# Patient Record
Sex: MALE | Race: AMERICAN INDIAN OR ALASKA NATIVE
[De-identification: names, ages, dates, MRNs, and addresses within clinical notes are randomized per-mention and may not be internally consistent; named-entity substitution may affect disease eponyms.]

---

## 2017-03-03 ENCOUNTER — APPOINTMENT (OUTPATIENT)
Dept: INTERNAL MEDICINE | Facility: CLINIC | Age: 52
End: 2017-03-03

## 2017-03-08 ENCOUNTER — RX RENEWAL (OUTPATIENT)
Age: 52
End: 2017-03-08

## 2017-04-14 ENCOUNTER — APPOINTMENT (OUTPATIENT)
Dept: GASTROENTEROLOGY | Facility: CLINIC | Age: 52
End: 2017-04-14

## 2017-04-26 ENCOUNTER — APPOINTMENT (OUTPATIENT)
Dept: INTERNAL MEDICINE | Facility: CLINIC | Age: 52
End: 2017-04-26

## 2017-05-02 ENCOUNTER — APPOINTMENT (OUTPATIENT)
Dept: INTERNAL MEDICINE | Facility: CLINIC | Age: 52
End: 2017-05-02

## 2017-05-02 ENCOUNTER — OUTPATIENT (OUTPATIENT)
Dept: OUTPATIENT SERVICES | Facility: HOSPITAL | Age: 52
LOS: 1 days | Discharge: HOME | End: 2017-05-02

## 2017-05-02 VITALS
WEIGHT: 226 LBS | HEIGHT: 67 IN | HEART RATE: 64 BPM | BODY MASS INDEX: 35.47 KG/M2 | SYSTOLIC BLOOD PRESSURE: 151 MMHG | DIASTOLIC BLOOD PRESSURE: 83 MMHG

## 2017-06-15 ENCOUNTER — MESSAGE (OUTPATIENT)
Age: 52
End: 2017-06-15

## 2017-06-21 ENCOUNTER — OUTPATIENT (OUTPATIENT)
Dept: OUTPATIENT SERVICES | Facility: HOSPITAL | Age: 52
LOS: 1 days | Discharge: HOME | End: 2017-06-21

## 2017-06-28 DIAGNOSIS — D12.2 BENIGN NEOPLASM OF ASCENDING COLON: ICD-10-CM

## 2017-06-28 DIAGNOSIS — D12.4 BENIGN NEOPLASM OF DESCENDING COLON: ICD-10-CM

## 2017-06-28 DIAGNOSIS — I10 ESSENTIAL (PRIMARY) HYPERTENSION: ICD-10-CM

## 2017-06-28 DIAGNOSIS — E11.9 TYPE 2 DIABETES MELLITUS WITHOUT COMPLICATIONS: ICD-10-CM

## 2017-06-28 DIAGNOSIS — D12.5 BENIGN NEOPLASM OF SIGMOID COLON: ICD-10-CM

## 2017-06-28 DIAGNOSIS — E78.00 PURE HYPERCHOLESTEROLEMIA, UNSPECIFIED: ICD-10-CM

## 2017-06-28 DIAGNOSIS — Z12.11 ENCOUNTER FOR SCREENING FOR MALIGNANT NEOPLASM OF COLON: ICD-10-CM

## 2017-06-28 DIAGNOSIS — D12.3 BENIGN NEOPLASM OF TRANSVERSE COLON: ICD-10-CM

## 2017-06-28 DIAGNOSIS — K64.8 OTHER HEMORRHOIDS: ICD-10-CM

## 2017-06-28 DIAGNOSIS — E78.4 OTHER HYPERLIPIDEMIA: ICD-10-CM

## 2017-08-09 LAB
ALBUMIN SERPL-MCNC: 3.7 G/DL
ALBUMIN/GLOB SERPL: 1.32
ALP SERPL-CCNC: 62 IU/L
ALT SERPL-CCNC: 22 IU/L
ANION GAP SERPL CALC-SCNC: 9 MEQ/L
AST SERPL-CCNC: 20 IU/L
BILIRUB SERPL-MCNC: 0.6 MG/DL
BUN SERPL-MCNC: 12 MG/DL
BUN/CREAT SERPL: 15.8 %
CALCIUM SERPL-MCNC: 9.2 MG/DL
CHLORIDE SERPL-SCNC: 104 MEQ/L
CHOLEST SERPL-MCNC: 128 MG/DL
CO2 SERPL-SCNC: 26 MEQ/L
CREAT SERPL-MCNC: 0.76 MG/DL
ESTIMATED AVERGAGE GLUCOSE (NORTH): 134 MG/DL
GFR SERPL CREATININE-BSD FRML MDRD: 108
GLUCOSE SERPL-MCNC: 100 MG/DL
HBA1C MFR BLD: 6.3 %
HDLC SERPL-MCNC: 38 MG/DL
HDLC SERPL: 3.37
LDLC SERPL DIRECT ASSAY-MCNC: 78 MG/DL
POTASSIUM SERPL-SCNC: 3.9 MMOL/L
PROT SERPL-MCNC: 6.5 G/DL
SODIUM SERPL-SCNC: 139 MEQ/L
TRIGL SERPL-MCNC: 99 MG/DL
VLDLC SERPL-MCNC: 19 MG/DL

## 2017-08-10 ENCOUNTER — APPOINTMENT (OUTPATIENT)
Dept: INTERNAL MEDICINE | Facility: CLINIC | Age: 52
End: 2017-08-10

## 2017-08-30 ENCOUNTER — OUTPATIENT (OUTPATIENT)
Dept: OUTPATIENT SERVICES | Facility: HOSPITAL | Age: 52
LOS: 1 days | Discharge: HOME | End: 2017-08-30

## 2017-08-30 ENCOUNTER — APPOINTMENT (OUTPATIENT)
Dept: INTERNAL MEDICINE | Facility: CLINIC | Age: 52
End: 2017-08-30

## 2017-08-30 VITALS
HEART RATE: 68 BPM | SYSTOLIC BLOOD PRESSURE: 138 MMHG | BODY MASS INDEX: 34.69 KG/M2 | WEIGHT: 221 LBS | HEIGHT: 67 IN | DIASTOLIC BLOOD PRESSURE: 80 MMHG

## 2017-08-30 RX ORDER — POLYETHYLENE GLYCOL 3350 AND ELECTROLYTES WITH LEMON FLAVOR 236; 22.74; 6.74; 5.86; 2.97 G/4L; G/4L; G/4L; G/4L; G/4L
236 POWDER, FOR SOLUTION ORAL
Qty: 1 | Refills: 0 | Status: COMPLETED | COMMUNITY
Start: 2017-04-14 | End: 2017-08-30

## 2017-08-31 DIAGNOSIS — E78.4 OTHER HYPERLIPIDEMIA: ICD-10-CM

## 2017-08-31 DIAGNOSIS — E11.9 TYPE 2 DIABETES MELLITUS WITHOUT COMPLICATIONS: ICD-10-CM

## 2017-08-31 DIAGNOSIS — I10 ESSENTIAL (PRIMARY) HYPERTENSION: ICD-10-CM

## 2017-09-05 ENCOUNTER — OUTPATIENT (OUTPATIENT)
Dept: OUTPATIENT SERVICES | Facility: HOSPITAL | Age: 52
LOS: 1 days | Discharge: HOME | End: 2017-09-05

## 2017-09-05 ENCOUNTER — APPOINTMENT (OUTPATIENT)
Dept: DERMATOLOGY | Facility: CLINIC | Age: 52
End: 2017-09-05

## 2017-10-20 ENCOUNTER — APPOINTMENT (OUTPATIENT)
Dept: GASTROENTEROLOGY | Facility: CLINIC | Age: 52
End: 2017-10-20

## 2017-10-20 ENCOUNTER — OUTPATIENT (OUTPATIENT)
Dept: OUTPATIENT SERVICES | Facility: HOSPITAL | Age: 52
LOS: 1 days | Discharge: HOME | End: 2017-10-20

## 2017-10-20 VITALS
DIASTOLIC BLOOD PRESSURE: 92 MMHG | BODY MASS INDEX: 34.69 KG/M2 | HEIGHT: 67 IN | HEART RATE: 75 BPM | SYSTOLIC BLOOD PRESSURE: 168 MMHG | WEIGHT: 221 LBS

## 2017-10-25 ENCOUNTER — OUTPATIENT (OUTPATIENT)
Dept: OUTPATIENT SERVICES | Facility: HOSPITAL | Age: 52
LOS: 1 days | Discharge: HOME | End: 2017-10-25

## 2017-11-01 DIAGNOSIS — D12.5 BENIGN NEOPLASM OF SIGMOID COLON: ICD-10-CM

## 2017-11-01 DIAGNOSIS — D12.4 BENIGN NEOPLASM OF DESCENDING COLON: ICD-10-CM

## 2017-11-01 DIAGNOSIS — K64.8 OTHER HEMORRHOIDS: ICD-10-CM

## 2017-11-03 DIAGNOSIS — D12.6 BENIGN NEOPLASM OF COLON, UNSPECIFIED: ICD-10-CM

## 2017-11-28 ENCOUNTER — OUTPATIENT (OUTPATIENT)
Dept: OUTPATIENT SERVICES | Facility: HOSPITAL | Age: 52
LOS: 1 days | Discharge: HOME | End: 2017-11-28

## 2017-11-30 DIAGNOSIS — D12.5 BENIGN NEOPLASM OF SIGMOID COLON: ICD-10-CM

## 2017-12-08 ENCOUNTER — OUTPATIENT (OUTPATIENT)
Dept: OUTPATIENT SERVICES | Facility: HOSPITAL | Age: 52
LOS: 1 days | Discharge: HOME | End: 2017-12-08

## 2017-12-08 ENCOUNTER — APPOINTMENT (OUTPATIENT)
Dept: INTERNAL MEDICINE | Facility: CLINIC | Age: 52
End: 2017-12-08

## 2017-12-08 VITALS
HEIGHT: 67 IN | WEIGHT: 215 LBS | HEART RATE: 72 BPM | BODY MASS INDEX: 33.74 KG/M2 | DIASTOLIC BLOOD PRESSURE: 103 MMHG | SYSTOLIC BLOOD PRESSURE: 172 MMHG

## 2017-12-08 RX ORDER — POLYETHYLENE GLYCOL 3350 AND ELECTROLYTES WITH LEMON FLAVOR 236; 22.74; 6.74; 5.86; 2.97 G/4L; G/4L; G/4L; G/4L; G/4L
236 POWDER, FOR SOLUTION ORAL
Qty: 1 | Refills: 0 | Status: DISCONTINUED | COMMUNITY
Start: 2017-10-20 | End: 2017-12-08

## 2017-12-11 DIAGNOSIS — E11.9 TYPE 2 DIABETES MELLITUS WITHOUT COMPLICATIONS: ICD-10-CM

## 2017-12-11 DIAGNOSIS — H40.9 UNSPECIFIED GLAUCOMA: ICD-10-CM

## 2017-12-11 DIAGNOSIS — I10 ESSENTIAL (PRIMARY) HYPERTENSION: ICD-10-CM

## 2017-12-11 DIAGNOSIS — E78.5 HYPERLIPIDEMIA, UNSPECIFIED: ICD-10-CM

## 2018-03-30 ENCOUNTER — APPOINTMENT (OUTPATIENT)
Dept: INTERNAL MEDICINE | Facility: CLINIC | Age: 53
End: 2018-03-30

## 2018-05-22 ENCOUNTER — LABORATORY RESULT (OUTPATIENT)
Age: 53
End: 2018-05-22

## 2018-05-29 ENCOUNTER — APPOINTMENT (OUTPATIENT)
Dept: INTERNAL MEDICINE | Facility: CLINIC | Age: 53
End: 2018-05-29

## 2018-05-29 ENCOUNTER — OUTPATIENT (OUTPATIENT)
Dept: OUTPATIENT SERVICES | Facility: HOSPITAL | Age: 53
LOS: 1 days | Discharge: HOME | End: 2018-05-29

## 2018-05-29 VITALS
SYSTOLIC BLOOD PRESSURE: 178 MMHG | HEIGHT: 67 IN | BODY MASS INDEX: 34.69 KG/M2 | WEIGHT: 221 LBS | DIASTOLIC BLOOD PRESSURE: 84 MMHG | HEART RATE: 98 BPM

## 2018-05-29 VITALS — DIASTOLIC BLOOD PRESSURE: 100 MMHG | SYSTOLIC BLOOD PRESSURE: 167 MMHG

## 2018-05-29 DIAGNOSIS — D49.9 NEOPLASM OF UNSPECIFIED BEHAVIOR OF UNSPECIFIED SITE: ICD-10-CM

## 2018-05-29 NOTE — PHYSICAL EXAM

## 2018-05-29 NOTE — ASSESSMENT
[FreeTextEntry1] : 51 yo M with HTN, DM II, DLD, bilat glaucoma and chronic back pain presenting for regular fu\par \par  - DM II, well controlled\par A1c = 6.4\par c/w metformin/ diet control./  weight loss\par \par  - HTN, well controlled at home despite elevated BP here\par c/w losartan- HCTZ\par white coat effect, needs BP journal record\par \par - DLD\par c/w lipitor\par \par \par - dysplastic colonic polyp s/p endoscopic excision\par fu with GI as scheduled\par \par - chronic back pain\par OT/ PT referral\par robaxin PRN\par \par - HCM:\par pt declined flu shot\par fu in 6 mo and prn\par pt has to reinstitute home care

## 2018-05-29 NOTE — HISTORY OF PRESENT ILLNESS
[FreeTextEntry1] : Follow up visit. [de-identified] : 51 yo M w/ PMH of bilateral glaucoma, hypertension, DM, DLD and chronic back pain presenting for regular follow up. Patient reports no worsening of his back/ neck pain and requesting referral again to OT and PT\par BP at home ranges between 120-130, no high readings, here it was 178/80, patient attributes it to walking in the sun all day.\par pt had 2 colonoscopies last year, had 4 polyps removed in total, 3 noncancerous and 1 precancerous as per patient, instructed to follow up with a repeat colonoscopy in one year.

## 2018-05-30 DIAGNOSIS — E78.5 HYPERLIPIDEMIA, UNSPECIFIED: ICD-10-CM

## 2018-05-30 DIAGNOSIS — E11.9 TYPE 2 DIABETES MELLITUS WITHOUT COMPLICATIONS: ICD-10-CM

## 2018-05-30 DIAGNOSIS — H40.9 UNSPECIFIED GLAUCOMA: ICD-10-CM

## 2018-05-30 DIAGNOSIS — I10 ESSENTIAL (PRIMARY) HYPERTENSION: ICD-10-CM

## 2018-06-15 ENCOUNTER — OTHER (OUTPATIENT)
Age: 53
End: 2018-06-15

## 2018-06-29 ENCOUNTER — OUTPATIENT (OUTPATIENT)
Dept: OUTPATIENT SERVICES | Facility: HOSPITAL | Age: 53
LOS: 1 days | Discharge: HOME | End: 2018-06-29

## 2018-08-09 ENCOUNTER — OUTPATIENT (OUTPATIENT)
Dept: OUTPATIENT SERVICES | Facility: HOSPITAL | Age: 53
LOS: 1 days | Discharge: HOME | End: 2018-08-09

## 2018-08-13 DIAGNOSIS — H04.123 DRY EYE SYNDROME OF BILATERAL LACRIMAL GLANDS: ICD-10-CM

## 2018-08-13 DIAGNOSIS — H17.13 CENTRAL CORNEAL OPACITY, BILATERAL: ICD-10-CM

## 2018-08-13 DIAGNOSIS — Q15.0 CONGENITAL GLAUCOMA: ICD-10-CM

## 2018-09-06 ENCOUNTER — OUTPATIENT (OUTPATIENT)
Dept: OUTPATIENT SERVICES | Facility: HOSPITAL | Age: 53
LOS: 1 days | Discharge: HOME | End: 2018-09-06

## 2018-09-06 DIAGNOSIS — S13.4XXA SPRAIN OF LIGAMENTS OF CERVICAL SPINE, INITIAL ENCOUNTER: ICD-10-CM

## 2018-09-13 LAB
ANION GAP SERPL CALC-SCNC: 16 MMOL/L
BASOPHILS # BLD AUTO: 0.04 K/UL
BASOPHILS NFR BLD AUTO: 0.4 %
BUN SERPL-MCNC: 12 MG/DL
CALCIUM SERPL-MCNC: 9.6 MG/DL
CHLORIDE SERPL-SCNC: 100 MMOL/L
CO2 SERPL-SCNC: 26 MMOL/L
CREAT SERPL-MCNC: 0.8 MG/DL
EOSINOPHIL # BLD AUTO: 0.24 K/UL
EOSINOPHIL NFR BLD AUTO: 2.4 %
GLUCOSE SERPL-MCNC: 97 MG/DL
HCT VFR BLD CALC: 44.4 %
HGB BLD-MCNC: 15.3 G/DL
IMM GRANULOCYTES NFR BLD AUTO: 0.5 %
LYMPHOCYTES # BLD AUTO: 2.46 K/UL
LYMPHOCYTES NFR BLD AUTO: 25 %
MAN DIFF?: NORMAL
MCHC RBC-ENTMCNC: 28.5 PG
MCHC RBC-ENTMCNC: 34.5 G/DL
MCV RBC AUTO: 82.8 FL
MONOCYTES # BLD AUTO: 0.68 K/UL
MONOCYTES NFR BLD AUTO: 6.9 %
NEUTROPHILS # BLD AUTO: 6.37 K/UL
NEUTROPHILS NFR BLD AUTO: 64.8 %
PLATELET # BLD AUTO: 298 K/UL
POTASSIUM SERPL-SCNC: 3.8 MMOL/L
RBC # BLD: 5.36 M/UL
RBC # FLD: 12.6 %
SODIUM SERPL-SCNC: 142 MMOL/L
WBC # FLD AUTO: 9.84 K/UL

## 2018-09-14 LAB
ALBUMIN SERPL ELPH-MCNC: 4.4 G/DL
ALP BLD-CCNC: 76 U/L
ALT SERPL-CCNC: 36 U/L
AST SERPL-CCNC: 18 U/L
BILIRUB DIRECT SERPL-MCNC: <0.2 MG/DL
BILIRUB INDIRECT SERPL-MCNC: >0.3 MG/DL
BILIRUB SERPL-MCNC: 0.5 MG/DL
PROT SERPL-MCNC: 7.2 G/DL

## 2018-09-17 ENCOUNTER — APPOINTMENT (OUTPATIENT)
Dept: INTERNAL MEDICINE | Facility: CLINIC | Age: 53
End: 2018-09-17

## 2018-09-17 ENCOUNTER — OUTPATIENT (OUTPATIENT)
Dept: OUTPATIENT SERVICES | Facility: HOSPITAL | Age: 53
LOS: 1 days | Discharge: HOME | End: 2018-09-17

## 2018-09-17 VITALS
BODY MASS INDEX: 35.47 KG/M2 | DIASTOLIC BLOOD PRESSURE: 109 MMHG | TEMPERATURE: 98.6 F | HEIGHT: 67 IN | SYSTOLIC BLOOD PRESSURE: 177 MMHG | WEIGHT: 226 LBS

## 2018-09-17 VITALS — DIASTOLIC BLOOD PRESSURE: 90 MMHG | SYSTOLIC BLOOD PRESSURE: 158 MMHG

## 2018-09-17 VITALS — SYSTOLIC BLOOD PRESSURE: 167 MMHG | DIASTOLIC BLOOD PRESSURE: 90 MMHG

## 2018-09-17 DIAGNOSIS — F32.9 MAJOR DEPRESSIVE DISORDER, SINGLE EPISODE, UNSPECIFIED: ICD-10-CM

## 2018-09-17 DIAGNOSIS — E78.5 HYPERLIPIDEMIA, UNSPECIFIED: ICD-10-CM

## 2018-09-17 DIAGNOSIS — H54.8 LEGAL BLINDNESS, AS DEFINED IN USA: ICD-10-CM

## 2018-09-17 DIAGNOSIS — I10 ESSENTIAL (PRIMARY) HYPERTENSION: ICD-10-CM

## 2018-09-26 ENCOUNTER — OUTPATIENT (OUTPATIENT)
Dept: OUTPATIENT SERVICES | Facility: HOSPITAL | Age: 53
LOS: 1 days | Discharge: HOME | End: 2018-09-26

## 2018-09-26 DIAGNOSIS — F41.0 PANIC DISORDER [EPISODIC PAROXYSMAL ANXIETY]: ICD-10-CM

## 2018-09-26 DIAGNOSIS — F32.1 MAJOR DEPRESSIVE DISORDER, SINGLE EPISODE, MODERATE: ICD-10-CM

## 2018-10-02 DIAGNOSIS — H17.13 CENTRAL CORNEAL OPACITY, BILATERAL: ICD-10-CM

## 2018-10-02 DIAGNOSIS — H04.123 DRY EYE SYNDROME OF BILATERAL LACRIMAL GLANDS: ICD-10-CM

## 2018-10-02 DIAGNOSIS — Q15.0 CONGENITAL GLAUCOMA: ICD-10-CM

## 2018-11-08 RX ORDER — SERTRALINE 25 MG/1
25 TABLET, FILM COATED ORAL
Qty: 7 | Refills: 0 | Status: DISCONTINUED | COMMUNITY
Start: 2018-09-26 | End: 2018-11-08

## 2018-12-07 ENCOUNTER — OUTPATIENT (OUTPATIENT)
Dept: OUTPATIENT SERVICES | Facility: HOSPITAL | Age: 53
LOS: 1 days | Discharge: HOME | End: 2018-12-07

## 2018-12-07 ENCOUNTER — LABORATORY RESULT (OUTPATIENT)
Age: 53
End: 2018-12-07

## 2018-12-07 DIAGNOSIS — F32.1 MAJOR DEPRESSIVE DISORDER, SINGLE EPISODE, MODERATE: ICD-10-CM

## 2018-12-07 DIAGNOSIS — F41.0 PANIC DISORDER [EPISODIC PAROXYSMAL ANXIETY]: ICD-10-CM

## 2018-12-10 LAB
CHOLEST SERPL-MCNC: 151 MG/DL
CHOLEST/HDLC SERPL: 3.7 RATIO
HDLC SERPL-MCNC: 41 MG/DL
LDLC SERPL CALC-MCNC: 98 MG/DL
TRIGL SERPL-MCNC: 133 MG/DL

## 2018-12-17 ENCOUNTER — APPOINTMENT (OUTPATIENT)
Dept: INTERNAL MEDICINE | Facility: CLINIC | Age: 53
End: 2018-12-17

## 2018-12-17 ENCOUNTER — OUTPATIENT (OUTPATIENT)
Dept: OUTPATIENT SERVICES | Facility: HOSPITAL | Age: 53
LOS: 1 days | Discharge: HOME | End: 2018-12-17

## 2018-12-17 VITALS
SYSTOLIC BLOOD PRESSURE: 182 MMHG | HEIGHT: 67 IN | BODY MASS INDEX: 35.47 KG/M2 | DIASTOLIC BLOOD PRESSURE: 105 MMHG | WEIGHT: 226 LBS | HEART RATE: 80 BPM | TEMPERATURE: 97.3 F

## 2018-12-17 VITALS — DIASTOLIC BLOOD PRESSURE: 96 MMHG | SYSTOLIC BLOOD PRESSURE: 174 MMHG

## 2018-12-17 RX ORDER — MECLIZINE HYDROCHLORIDE 25 MG/1
25 TABLET ORAL 3 TIMES DAILY
Qty: 30 | Refills: 3 | Status: DISCONTINUED | COMMUNITY
Start: 2018-09-17 | End: 2018-12-17

## 2018-12-17 NOTE — ASSESSMENT
[FreeTextEntry1] : 53M with HTN, DM II, DLD, bilat glaucoma and chronic back pain presenting for regular fu, recently seen by ophtho.\par \par #DMII: \par - 12/2018: Last A1C 6.7\par - Discussed healthy diet\par - Continue with metformin\par - has appt with opthalmologist in january, Will make appt with podiatry \par \par #HTN: elevated, states home BP checks are better controlled\par - Continue with amlodipine, losartan-HCTZ\par \par #DLD:\par - c/w lipitor\par - 12/2018 ldl 98\par \par #Depression: \par - Now on zoloft 75mg \par - follows with psychiatry\par \par #Chronic back/shoulder pain \par - PT referral\par - robaxin PRN\par \par #Glaucoma: has ophtho appointment this week, being seen every 6 weeks\par \par #Polyps: \par - Follow up with GI for repeat colonoscopy\par \par HCM:\par - colonoscopy in april 2019\par - refused flu shot \par rto in 3 months and prn

## 2018-12-17 NOTE — HISTORY OF PRESENT ILLNESS
[de-identified] : 53M with HTN, DM II, DLD, prediabetes, bilat glaucoma and chronic back pain, depression presenting for regular fu and blood work results. He states on his last visit he had dizziness which has now improved after following psychiatrist and starting sertraline.  Denies any fevers, chills, chest pain, palpitations, sob, dysuria, diarrhea, constipations.

## 2018-12-17 NOTE — PHYSICAL EXAM
[No Acute Distress] : no acute distress [Well Nourished] : well nourished [No Respiratory Distress] : no respiratory distress  [Clear to Auscultation] : lungs were clear to auscultation bilaterally [Normal Rate] : normal rate  [Regular Rhythm] : with a regular rhythm [Normal S1, S2] : normal S1 and S2

## 2018-12-19 DIAGNOSIS — E78.5 HYPERLIPIDEMIA, UNSPECIFIED: ICD-10-CM

## 2018-12-19 DIAGNOSIS — E11.9 TYPE 2 DIABETES MELLITUS WITHOUT COMPLICATIONS: ICD-10-CM

## 2018-12-19 DIAGNOSIS — I10 ESSENTIAL (PRIMARY) HYPERTENSION: ICD-10-CM

## 2019-01-09 ENCOUNTER — OUTPATIENT (OUTPATIENT)
Dept: OUTPATIENT SERVICES | Facility: HOSPITAL | Age: 54
LOS: 1 days | Discharge: HOME | End: 2019-01-09

## 2019-01-09 DIAGNOSIS — F41.0 PANIC DISORDER [EPISODIC PAROXYSMAL ANXIETY]: ICD-10-CM

## 2019-01-09 DIAGNOSIS — F32.1 MAJOR DEPRESSIVE DISORDER, SINGLE EPISODE, MODERATE: ICD-10-CM

## 2019-01-14 ENCOUNTER — RX RENEWAL (OUTPATIENT)
Age: 54
End: 2019-01-14

## 2019-02-28 ENCOUNTER — RX RENEWAL (OUTPATIENT)
Age: 54
End: 2019-02-28

## 2019-03-11 ENCOUNTER — APPOINTMENT (OUTPATIENT)
Dept: INTERNAL MEDICINE | Facility: CLINIC | Age: 54
End: 2019-03-11

## 2019-03-27 ENCOUNTER — OUTPATIENT (OUTPATIENT)
Dept: OUTPATIENT SERVICES | Facility: HOSPITAL | Age: 54
LOS: 1 days | Discharge: HOME | End: 2019-03-27

## 2019-03-27 ENCOUNTER — APPOINTMENT (OUTPATIENT)
Dept: INTERNAL MEDICINE | Facility: CLINIC | Age: 54
End: 2019-03-27

## 2019-03-27 VITALS
HEART RATE: 90 BPM | HEIGHT: 67 IN | TEMPERATURE: 97.6 F | DIASTOLIC BLOOD PRESSURE: 90 MMHG | WEIGHT: 228 LBS | SYSTOLIC BLOOD PRESSURE: 163 MMHG | BODY MASS INDEX: 35.79 KG/M2

## 2019-03-27 VITALS — SYSTOLIC BLOOD PRESSURE: 139 MMHG | DIASTOLIC BLOOD PRESSURE: 87 MMHG

## 2019-03-27 DIAGNOSIS — I10 ESSENTIAL (PRIMARY) HYPERTENSION: ICD-10-CM

## 2019-03-27 DIAGNOSIS — D17.9 BENIGN LIPOMATOUS NEOPLASM, UNSPECIFIED: ICD-10-CM

## 2019-03-27 DIAGNOSIS — E11.9 TYPE 2 DIABETES MELLITUS WITHOUT COMPLICATIONS: ICD-10-CM

## 2019-03-27 DIAGNOSIS — F32.1 MAJOR DEPRESSIVE DISORDER, SINGLE EPISODE, MODERATE: ICD-10-CM

## 2019-03-27 DIAGNOSIS — E78.5 HYPERLIPIDEMIA, UNSPECIFIED: ICD-10-CM

## 2019-03-27 DIAGNOSIS — H44.513 ABSOLUTE GLAUCOMA, BILATERAL: ICD-10-CM

## 2019-03-27 RX ORDER — LOSARTAN POTASSIUM AND HYDROCHLOROTHIAZIDE 25; 100 MG/1; MG/1
100-25 TABLET ORAL DAILY
Qty: 90 | Refills: 3 | Status: DISCONTINUED | COMMUNITY
Start: 2018-12-17 | End: 2019-03-27

## 2019-03-27 RX ORDER — SERTRALINE HYDROCHLORIDE 50 MG/1
50 TABLET, FILM COATED ORAL
Qty: 45 | Refills: 0 | Status: DISCONTINUED | COMMUNITY
Start: 2018-09-26 | End: 2019-03-27

## 2019-03-27 NOTE — ASSESSMENT
[FreeTextEntry1] : 53M with HTN, DM II, DLD, legally blind due to bilateral glaucoma and chronic back pain, depression presenting for regular follow up.\par Currently he feels well.\par He tapered and stopped taking sertraline for insurance issues about 1 month ago however he reports feeling fine at this time.\par He is scheduled for repeat colonoscopy next week.\par \par #DMII: \par - 12/2018: Last A1C 6.7, will repeat\par - Discussed healthy diet\par - Continue with metformin\par \par #bilateral glaucoma/ legally blind\par cont eye drops\par f/u ophthalmology\par \par #HTN: elevated today: 163/90, repeat 139/87\par  states home BP checks are better controlled\par - Continue with amlodipine, losartan-HCTZ will be switched to irbesartan-HCTZ because losartan has been recalled\par \par #DLD:\par - c/w lipitor\par - 12/2018 ldl 98, will repeat\par \par #Depression: \par - stopped sertraline\par - follow up with psychiatry\par \par #lipoma of R shoulder\par gen surgery referral\par \par #Polyps (tubular adenomas + irregular suspicious sigmoid polyp): \par - has an appointment for colonoscopy next week\par \par HCM:\par routine labs.\par rto in 3 months and prn.

## 2019-03-27 NOTE — PHYSICAL EXAM
[No Acute Distress] : no acute distress [Well Nourished] : well nourished [Well Developed] : well developed [Well-Appearing] : well-appearing [Normal Outer Ear/Nose] : the outer ears and nose were normal in appearance [Normal Oropharynx] : the oropharynx was normal [No JVD] : no jugular venous distention [Supple] : supple [No Lymphadenopathy] : no lymphadenopathy [Thyroid Normal, No Nodules] : the thyroid was normal and there were no nodules present [No Respiratory Distress] : no respiratory distress  [Clear to Auscultation] : lungs were clear to auscultation bilaterally [No Accessory Muscle Use] : no accessory muscle use [Normal Rate] : normal rate  [Regular Rhythm] : with a regular rhythm [Normal S1, S2] : normal S1 and S2 [No Murmur] : no murmur heard [No Carotid Bruits] : no carotid bruits [No Abdominal Bruit] : a ~M bruit was not heard ~T in the abdomen [No Varicosities] : no varicosities [Pedal Pulses Present] : the pedal pulses are present [No Edema] : there was no peripheral edema [No Extremity Clubbing/Cyanosis] : no extremity clubbing/cyanosis [No Palpable Aorta] : no palpable aorta [Soft] : abdomen soft [Non Tender] : non-tender [Non-distended] : non-distended [No Masses] : no abdominal mass palpated [No HSM] : no HSM [Normal Bowel Sounds] : normal bowel sounds [Normal Posterior Cervical Nodes] : no posterior cervical lymphadenopathy [Normal Anterior Cervical Nodes] : no anterior cervical lymphadenopathy [No CVA Tenderness] : no CVA  tenderness [No Spinal Tenderness] : no spinal tenderness [No Joint Swelling] : no joint swelling [Grossly Normal Strength/Tone] : grossly normal strength/tone [No Rash] : no rash [Normal Gait] : normal gait [Coordination Grossly Intact] : coordination grossly intact [No Focal Deficits] : no focal deficits [Deep Tendon Reflexes (DTR)] : deep tendon reflexes were 2+ and symmetric [Normal Affect] : the affect was normal [Normal Insight/Judgement] : insight and judgment were intact [de-identified] : fluctuant soft tissue mass on the ant right shoulder

## 2019-03-27 NOTE — HISTORY OF PRESENT ILLNESS
[FreeTextEntry1] : follow up [de-identified] : 53M with HTN, DM II, DLD, legally blind due to bilateral glaucoma and chronic back pain, depression presenting for regular follow up.ults. \par Currently he feels well.\par He tapered and stopped taking sertraline for insurance issues about 1 month ago however he reports feeling fine at this time.\par He is scheduled for repeat colonoscopy next week.

## 2019-04-04 ENCOUNTER — OUTPATIENT (OUTPATIENT)
Dept: OUTPATIENT SERVICES | Facility: HOSPITAL | Age: 54
LOS: 1 days | Discharge: HOME | End: 2019-04-04
Payer: MEDICAID

## 2019-04-04 ENCOUNTER — RESULT REVIEW (OUTPATIENT)
Age: 54
End: 2019-04-04

## 2019-04-04 ENCOUNTER — TRANSCRIPTION ENCOUNTER (OUTPATIENT)
Age: 54
End: 2019-04-04

## 2019-04-04 VITALS — DIASTOLIC BLOOD PRESSURE: 76 MMHG | SYSTOLIC BLOOD PRESSURE: 148 MMHG | RESPIRATION RATE: 12 BRPM | HEART RATE: 66 BPM

## 2019-04-04 VITALS
HEART RATE: 66 BPM | SYSTOLIC BLOOD PRESSURE: 163 MMHG | TEMPERATURE: 98 F | HEIGHT: 67 IN | DIASTOLIC BLOOD PRESSURE: 93 MMHG | WEIGHT: 225.97 LBS | RESPIRATION RATE: 18 BRPM

## 2019-04-04 DIAGNOSIS — Z94.7 CORNEAL TRANSPLANT STATUS: Chronic | ICD-10-CM

## 2019-04-04 DIAGNOSIS — Z98.890 OTHER SPECIFIED POSTPROCEDURAL STATES: Chronic | ICD-10-CM

## 2019-04-04 PROCEDURE — 88305 TISSUE EXAM BY PATHOLOGIST: CPT | Mod: 26

## 2019-04-05 LAB — SURGICAL PATHOLOGY STUDY: SIGNIFICANT CHANGE UP

## 2019-04-09 DIAGNOSIS — K64.8 OTHER HEMORRHOIDS: ICD-10-CM

## 2019-04-09 DIAGNOSIS — D12.3 BENIGN NEOPLASM OF TRANSVERSE COLON: ICD-10-CM

## 2019-04-09 DIAGNOSIS — Z12.11 ENCOUNTER FOR SCREENING FOR MALIGNANT NEOPLASM OF COLON: ICD-10-CM

## 2019-04-09 DIAGNOSIS — Z85.038 PERSONAL HISTORY OF OTHER MALIGNANT NEOPLASM OF LARGE INTESTINE: ICD-10-CM

## 2019-05-29 ENCOUNTER — RX RENEWAL (OUTPATIENT)
Age: 54
End: 2019-05-29

## 2019-06-18 ENCOUNTER — APPOINTMENT (OUTPATIENT)
Dept: NEUROLOGY | Facility: CLINIC | Age: 54
End: 2019-06-18

## 2019-06-18 ENCOUNTER — APPOINTMENT (OUTPATIENT)
Dept: NEUROLOGY | Facility: CLINIC | Age: 54
End: 2019-06-18
Payer: MEDICAID

## 2019-06-18 ENCOUNTER — OUTPATIENT (OUTPATIENT)
Dept: OUTPATIENT SERVICES | Facility: HOSPITAL | Age: 54
LOS: 1 days | Discharge: HOME | End: 2019-06-18

## 2019-06-18 VITALS — HEART RATE: 75 BPM | SYSTOLIC BLOOD PRESSURE: 167 MMHG | DIASTOLIC BLOOD PRESSURE: 91 MMHG

## 2019-06-18 DIAGNOSIS — Z98.890 OTHER SPECIFIED POSTPROCEDURAL STATES: Chronic | ICD-10-CM

## 2019-06-18 DIAGNOSIS — Z94.7 CORNEAL TRANSPLANT STATUS: Chronic | ICD-10-CM

## 2019-06-18 PROBLEM — H40.9 UNSPECIFIED GLAUCOMA: Chronic | Status: ACTIVE | Noted: 2019-04-04

## 2019-06-18 PROBLEM — I10 ESSENTIAL (PRIMARY) HYPERTENSION: Chronic | Status: ACTIVE | Noted: 2019-04-04

## 2019-06-18 PROBLEM — H54.7 UNSPECIFIED VISUAL LOSS: Chronic | Status: ACTIVE | Noted: 2019-04-04

## 2019-06-18 PROBLEM — E78.00 PURE HYPERCHOLESTEROLEMIA, UNSPECIFIED: Chronic | Status: ACTIVE | Noted: 2019-04-04

## 2019-06-18 PROBLEM — E11.9 TYPE 2 DIABETES MELLITUS WITHOUT COMPLICATIONS: Chronic | Status: ACTIVE | Noted: 2019-04-04

## 2019-06-18 PROCEDURE — ZZZZZ: CPT

## 2019-06-18 NOTE — END OF VISIT
[] : Resident [FreeTextEntry3] : pt seen and examined\par agree with HS note\par patient with transient vertigo last year\par non focal exam\par likely vestibular neuritis\par pt asymptomatic now\par no further w/u needed at this time\par f/u prn

## 2019-06-18 NOTE — HISTORY OF PRESENT ILLNESS
[FreeTextEntry1] : 53M with PMH of HTN, DM, depression, DLD, and legally blind 2/2 glaucoma/cataracts presented to neurology clinic for episodic vertigo, feeling off-center and "like something was pulling him to the right". Patient was given meclizine by his PCP, which helped but he felt like "wiped him out". Pt denied tinnitus, hearing loss, N/V, photophobia. Episodes of vertigo have resolved.

## 2019-06-18 NOTE — PHYSICAL EXAM
[General Appearance - Alert] : alert [General Appearance - In No Acute Distress] : in no acute distress [General Appearance - Well Nourished] : well nourished [General Appearance - Well Developed] : well developed [Oriented To Time, Place, And Person] : oriented to person, place, and time [Impaired Insight] : insight and judgment were intact [Affect] : the affect was normal [Motor Tone] : muscle tone was normal in all four extremities [Motor Strength] : muscle strength was normal in all four extremities [Sensation Tactile Decrease] : light touch was intact [Proprioception] : proprioception was intact [Abnormal Walk] : normal gait [Balance] : balance was intact [Motor Strength Upper Extremities Bilaterally] : strength was normal in both upper extremities [Motor Strength Lower Extremities Bilaterally] : strength was normal in both lower extremities [Romberg's Sign] : Romberg's sign was negtive [FreeTextEntry5] : Bilateral opacifications of cornea

## 2019-06-18 NOTE — DISCUSSION/SUMMARY
[FreeTextEntry1] : 53M with PMH of DM, HTN, DLD, depression, and legally blind 2/2 glaucoma/cataracts presented to neurology clinic for episodic vertigo, with sensation of being off-balance and pulled to the right. Patient took meclizine in the past, which helped but made him feel "wiped out". Vertigo episodes have almost completely resolved. No abnormalities of proprioception, balance, or gait on exam. \par \par #Vertigo, Improved\par -Meclizine PRN dizziness\par -Follow up PRN with neurology

## 2019-07-08 ENCOUNTER — APPOINTMENT (OUTPATIENT)
Dept: INTERNAL MEDICINE | Facility: CLINIC | Age: 54
End: 2019-07-08

## 2019-08-08 LAB
25(OH)D3 SERPL-MCNC: 21 NG/ML
ALBUMIN SERPL ELPH-MCNC: 4.5 G/DL
ALP BLD-CCNC: 82 U/L
ALT SERPL-CCNC: 35 U/L
ANION GAP SERPL CALC-SCNC: 13 MMOL/L
APTT BLD: 37.9 SEC
AST SERPL-CCNC: 17 U/L
BASOPHILS # BLD AUTO: 0.02 K/UL
BASOPHILS NFR BLD AUTO: 0.2 %
BILIRUB SERPL-MCNC: 0.5 MG/DL
BUN SERPL-MCNC: 10 MG/DL
CALCIUM SERPL-MCNC: 9.2 MG/DL
CHLORIDE SERPL-SCNC: 101 MMOL/L
CHOLEST SERPL-MCNC: 148 MG/DL
CHOLEST/HDLC SERPL: 3.9 RATIO
CO2 SERPL-SCNC: 28 MMOL/L
CREAT SERPL-MCNC: 0.8 MG/DL
EOSINOPHIL # BLD AUTO: 0.36 K/UL
EOSINOPHIL NFR BLD AUTO: 4.1 %
ESTIMATED AVERAGE GLUCOSE: 146 MG/DL
GLUCOSE SERPL-MCNC: 128 MG/DL
HBA1C MFR BLD HPLC: 6.7 %
HCT VFR BLD CALC: 43.6 %
HDLC SERPL-MCNC: 38 MG/DL
HGB BLD-MCNC: 14.7 G/DL
IMM GRANULOCYTES NFR BLD AUTO: 0.6 %
INR PPP: 0.95 RATIO
LDLC SERPL CALC-MCNC: 97 MG/DL
LYMPHOCYTES # BLD AUTO: 2.13 K/UL
LYMPHOCYTES NFR BLD AUTO: 24.4 %
MAN DIFF?: NORMAL
MCHC RBC-ENTMCNC: 28.1 PG
MCHC RBC-ENTMCNC: 33.7 G/DL
MCV RBC AUTO: 83.4 FL
MONOCYTES # BLD AUTO: 0.66 K/UL
MONOCYTES NFR BLD AUTO: 7.6 %
NEUTROPHILS # BLD AUTO: 5.52 K/UL
NEUTROPHILS NFR BLD AUTO: 63.1 %
PLATELET # BLD AUTO: 283 K/UL
POTASSIUM SERPL-SCNC: 4.4 MMOL/L
PROT SERPL-MCNC: 7.1 G/DL
PT BLD: 10.9 SEC
RBC # BLD: 5.23 M/UL
RBC # FLD: 12.6 %
SODIUM SERPL-SCNC: 142 MMOL/L
TRIGL SERPL-MCNC: 120 MG/DL
WBC # FLD AUTO: 8.74 K/UL

## 2019-08-09 LAB — TSH SERPL-ACNC: 1.05 UIU/ML

## 2019-08-14 ENCOUNTER — APPOINTMENT (OUTPATIENT)
Dept: INTERNAL MEDICINE | Facility: CLINIC | Age: 54
End: 2019-08-14

## 2019-08-14 ENCOUNTER — OUTPATIENT (OUTPATIENT)
Dept: OUTPATIENT SERVICES | Facility: HOSPITAL | Age: 54
LOS: 1 days | Discharge: HOME | End: 2019-08-14

## 2019-08-14 VITALS
HEART RATE: 70 BPM | HEIGHT: 67 IN | TEMPERATURE: 97.6 F | WEIGHT: 230 LBS | DIASTOLIC BLOOD PRESSURE: 89 MMHG | BODY MASS INDEX: 36.1 KG/M2 | SYSTOLIC BLOOD PRESSURE: 163 MMHG

## 2019-08-14 DIAGNOSIS — Z94.7 CORNEAL TRANSPLANT STATUS: Chronic | ICD-10-CM

## 2019-08-14 DIAGNOSIS — Z98.890 OTHER SPECIFIED POSTPROCEDURAL STATES: Chronic | ICD-10-CM

## 2019-08-14 NOTE — ASSESSMENT
[FreeTextEntry1] : # Vit D def\par - 08/2019 level 21\par - Start Vit D \par \par #DMII: \par - 08/2019: Last A1C 6.7, stable\par - Discussed healthy diet\par - Continue with metformin\par \par #bilateral glaucoma/ legally blind\par cont eye drops\par f/u ophthalmology\par \par #HTN: elevated today: 168/90, repeat 139/87\par  states home BP checks are better controlled\par -  increase amlodipine to 10mg amlodipine, losartan-HCTZ will be switched to irbesartan-HCTZ because losartan has been recalled\par \par #DLD:\par - c/w lipitor\par - 08/2019/2018 ldl 97\par \par #Depression: \par - stopped sertraline\par - follow up with psychiatry\par \par #Polyps (tubular adenomas + irregular suspicious sigmoid polyp): \par - follow up with GI in 2022\par \par HCM:\par rto in 3 months for flu shot and prn

## 2019-08-14 NOTE — HISTORY OF PRESENT ILLNESS
[FreeTextEntry1] : follow up [de-identified] : 54M with HTN, DM II, DLD, legally blind due to bilateral glaucoma and chronic back pain, depression presenting for regular follow up. Reports feeling well. no complaints\par Pt has been off sertraline for the past 2 month and reports his mood has been great. \par Pt had repeat colonoscopy 04/2019 found to have one polyp. GI recommended follow up in 3 years\par BP elevated in office pt patient reports BP at home average 130-140's.

## 2019-08-14 NOTE — PHYSICAL EXAM
[Well Nourished] : well nourished [No Acute Distress] : no acute distress [Well Developed] : well developed [Well-Appearing] : well-appearing [Normal Outer Ear/Nose] : the outer ears and nose were normal in appearance [Normal Oropharynx] : the oropharynx was normal [No JVD] : no jugular venous distention [Supple] : supple [No Lymphadenopathy] : no lymphadenopathy [Thyroid Normal, No Nodules] : the thyroid was normal and there were no nodules present [No Respiratory Distress] : no respiratory distress  [Clear to Auscultation] : lungs were clear to auscultation bilaterally [No Accessory Muscle Use] : no accessory muscle use [Normal Rate] : normal rate  [Normal S1, S2] : normal S1 and S2 [Regular Rhythm] : with a regular rhythm [No Murmur] : no murmur heard [No Abdominal Bruit] : a ~M bruit was not heard ~T in the abdomen [No Varicosities] : no varicosities [No Carotid Bruits] : no carotid bruits [Pedal Pulses Present] : the pedal pulses are present [No Edema] : there was no peripheral edema [No Extremity Clubbing/Cyanosis] : no extremity clubbing/cyanosis [No Palpable Aorta] : no palpable aorta [Soft] : abdomen soft [Non Tender] : non-tender [Non-distended] : non-distended [No Masses] : no abdominal mass palpated [No HSM] : no HSM [Normal Bowel Sounds] : normal bowel sounds [Normal Posterior Cervical Nodes] : no posterior cervical lymphadenopathy [Normal Anterior Cervical Nodes] : no anterior cervical lymphadenopathy [No CVA Tenderness] : no CVA  tenderness [No Spinal Tenderness] : no spinal tenderness [No Joint Swelling] : no joint swelling [Grossly Normal Strength/Tone] : grossly normal strength/tone [No Rash] : no rash [Normal Gait] : normal gait [Coordination Grossly Intact] : coordination grossly intact [No Focal Deficits] : no focal deficits [Deep Tendon Reflexes (DTR)] : deep tendon reflexes were 2+ and symmetric [Normal Affect] : the affect was normal [Normal Insight/Judgement] : insight and judgment were intact [de-identified] : fluctuant soft tissue mass on the ant right shoulder

## 2019-08-20 DIAGNOSIS — E11.9 TYPE 2 DIABETES MELLITUS WITHOUT COMPLICATIONS: ICD-10-CM

## 2019-08-20 DIAGNOSIS — E78.5 HYPERLIPIDEMIA, UNSPECIFIED: ICD-10-CM

## 2019-08-20 DIAGNOSIS — E55.9 VITAMIN D DEFICIENCY, UNSPECIFIED: ICD-10-CM

## 2019-08-20 DIAGNOSIS — I10 ESSENTIAL (PRIMARY) HYPERTENSION: ICD-10-CM

## 2019-12-05 ENCOUNTER — RX RENEWAL (OUTPATIENT)
Age: 54
End: 2019-12-05

## 2020-02-03 ENCOUNTER — RX RENEWAL (OUTPATIENT)
Age: 55
End: 2020-02-03

## 2020-02-10 ENCOUNTER — APPOINTMENT (OUTPATIENT)
Dept: INTERNAL MEDICINE | Facility: CLINIC | Age: 55
End: 2020-02-10
Payer: MEDICAID

## 2020-02-10 ENCOUNTER — OUTPATIENT (OUTPATIENT)
Dept: OUTPATIENT SERVICES | Facility: HOSPITAL | Age: 55
LOS: 1 days | Discharge: HOME | End: 2020-02-10

## 2020-02-10 VITALS
HEART RATE: 75 BPM | DIASTOLIC BLOOD PRESSURE: 85 MMHG | HEIGHT: 67 IN | BODY MASS INDEX: 35.94 KG/M2 | WEIGHT: 229 LBS | SYSTOLIC BLOOD PRESSURE: 169 MMHG

## 2020-02-10 DIAGNOSIS — Z87.39 PERSONAL HISTORY OF OTHER DISEASES OF THE MUSCULOSKELETAL SYSTEM AND CONNECTIVE TISSUE: ICD-10-CM

## 2020-02-10 DIAGNOSIS — H44.513 ABSOLUTE GLAUCOMA, BILATERAL: ICD-10-CM

## 2020-02-10 DIAGNOSIS — Z87.898 PERSONAL HISTORY OF OTHER SPECIFIED CONDITIONS: ICD-10-CM

## 2020-02-10 DIAGNOSIS — Z86.59 PERSONAL HISTORY OF OTHER MENTAL AND BEHAVIORAL DISORDERS: ICD-10-CM

## 2020-02-10 DIAGNOSIS — Z98.890 OTHER SPECIFIED POSTPROCEDURAL STATES: Chronic | ICD-10-CM

## 2020-02-10 DIAGNOSIS — Z94.7 CORNEAL TRANSPLANT STATUS: Chronic | ICD-10-CM

## 2020-02-10 DIAGNOSIS — Z86.018 PERSONAL HISTORY OF OTHER BENIGN NEOPLASM: ICD-10-CM

## 2020-02-10 DIAGNOSIS — F32.1 MAJOR DEPRESSIVE DISORDER, SINGLE EPISODE, MODERATE: ICD-10-CM

## 2020-02-10 PROCEDURE — 99213 OFFICE O/P EST LOW 20 MIN: CPT | Mod: GC

## 2020-02-10 RX ORDER — UBIDECARENONE/VIT E ACET 100MG-5
50 MCG CAPSULE ORAL
Qty: 30 | Refills: 5 | Status: DISCONTINUED | COMMUNITY
Start: 2019-08-14 | End: 2020-02-10

## 2020-02-10 NOTE — REVIEW OF SYSTEMS
[Back Pain] : back pain [Itching] : itching [Negative] : Psychiatric [Fever] : no fever [Night Sweats] : no night sweats [Earache] : no earache [Hearing Loss] : no hearing loss [Nasal Discharge] : no nasal discharge [Chest Pain] : no chest pain [Palpitations] : no palpitations [Orthopena] : no orthopnea [Shortness Of Breath] : no shortness of breath [Wheezing] : no wheezing [Cough] : no cough [Abdominal Pain] : no abdominal pain [Nausea] : no nausea [Vomiting] : no vomiting [Dysuria] : no dysuria [Hematuria] : no hematuria [FreeTextEntry3] : blindness [de-identified] : as described in HPI

## 2020-02-10 NOTE — PLAN
[FreeTextEntry1] : #skin itch\par - physical exam suggestive of Tinea corporis\par - will start Miconazole cream and derm referal. \par \par # Vit D def\par - repeat level\par - continue vit d supplements \par \par #DMII: XrZ2d=5.7 in august 2019\par - repeat HbA1c\par - Continue with metformin\par - podiatry referral, \par \par # back pain - musculoskeletal. \par - PT referral, \par \par #bilateral glaucoma/ legally blind\par cont eye drops\par referral to opthamlo\par \par #HTN: elevated today: \par  - BP at home max 125\par \par #DLD:\par - c/w lipitor\par - repeat lipid panel \par \par #Depression: \par - now off sertraline with no symptoms of depression.\par \par #Polyps (tubular adenomas + irregular suspicious sigmoid polyp): \par - follow up with GI in 2021\par \par HCM:\par - INFLUENZA - refused. \par - colonoscopy in 2021. \par - f/u in 3 months and prn\par \par

## 2020-02-10 NOTE — HISTORY OF PRESENT ILLNESS
[FreeTextEntry1] : follow up \par \par  [de-identified] : 54M with HTN, DM II (last XzW9e=6.7% in aug 2019), DLD, legally blind due to bilateral glaucoma and chronic back pain, depression.\par Presenting for follow up\par Interval history: \par - reports couple of months of skin itching, partially relieved by hydrocortisone. Itch is occasional. No open wound, no discoloration of skin. \par He reports few comforter, which he stopped using, with slight improvement in his itch.\par - reports chronic lower back pain. He was never able to go to physical therapy.  \par - BP control at home sbp 120-125 and at [facility] same. \par

## 2020-02-10 NOTE — ASSESSMENT
[FreeTextEntry1] : 54M with HTN, DM II (last ZmL9r=5.7% in aug 2019), DLD, legally blind due to bilateral glaucoma and chronic back pain, depression.\par Presenting for follow up. Interval history is only positive for skin itch with skin lesions suggestive of Tinea corporis

## 2020-02-10 NOTE — PHYSICAL EXAM
[Normal] : soft, non-tender, non-distended, no masses palpated, no HSM and normal bowel sounds [de-identified] : legally blind [de-identified] : multiple round lesions, target like on RLE and abdomen. 4 lesions on LLE. Highly suggestive of Tinea corporis.

## 2020-02-24 DIAGNOSIS — H44.513 ABSOLUTE GLAUCOMA, BILATERAL: ICD-10-CM

## 2020-02-24 DIAGNOSIS — I10 ESSENTIAL (PRIMARY) HYPERTENSION: ICD-10-CM

## 2020-02-24 DIAGNOSIS — E11.9 TYPE 2 DIABETES MELLITUS WITHOUT COMPLICATIONS: ICD-10-CM

## 2020-02-24 DIAGNOSIS — K63.5 POLYP OF COLON: ICD-10-CM

## 2020-02-24 DIAGNOSIS — F32.1 MAJOR DEPRESSIVE DISORDER, SINGLE EPISODE, MODERATE: ICD-10-CM

## 2020-02-24 DIAGNOSIS — M54.9 DORSALGIA, UNSPECIFIED: ICD-10-CM

## 2020-02-24 DIAGNOSIS — E78.5 HYPERLIPIDEMIA, UNSPECIFIED: ICD-10-CM

## 2020-02-26 ENCOUNTER — OUTPATIENT (OUTPATIENT)
Dept: OUTPATIENT SERVICES | Facility: HOSPITAL | Age: 55
LOS: 1 days | Discharge: HOME | End: 2020-02-26

## 2020-02-26 ENCOUNTER — APPOINTMENT (OUTPATIENT)
Dept: PODIATRY | Facility: CLINIC | Age: 55
End: 2020-02-26
Payer: MEDICAID

## 2020-02-26 DIAGNOSIS — Z98.890 OTHER SPECIFIED POSTPROCEDURAL STATES: Chronic | ICD-10-CM

## 2020-02-26 DIAGNOSIS — Z94.7 CORNEAL TRANSPLANT STATUS: Chronic | ICD-10-CM

## 2020-02-26 PROCEDURE — 99203 OFFICE O/P NEW LOW 30 MIN: CPT

## 2020-02-28 NOTE — PHYSICAL EXAM
[Oriented To Time, Place, And Person] : oriented to person, place, and time [Impaired Insight] : insight and judgment were intact [General Appearance - In No Acute Distress] : in no acute distress [General Appearance - Alert] : alert [Skin Lesions] : no skin lesions [Vibration Dec.] : normal vibratory sensation at the level of the toes [Diminished Throughout Right Foot] : normal tactile sensation with monofilament testing throughout right foot [Diminished Throughout Left Foot] : normal tactile sensation with monofilament testing throughout left foot [FreeTextEntry1] : Vibratory Sensation WNL; Protective Sensation Intact; No Deficits Noted

## 2020-02-28 NOTE — END OF VISIT
[] : Resident [FreeTextEntry3] : -Loss of protective sensation:   no\par -presence of pre-ulcerative lesion:  no\par   -hemorrhage into callus:  no\par -atrophy of heel or metatarsal fat pads:  no\par \par -Diabetic neurovascular foot assessment  performed. \par -Discussed with patient diabetic foot hygiene.Patient instructed to regularly check the bottom of the feet\par -Patient given a diabetic foot education sheet\par \par

## 2020-02-28 NOTE — HISTORY OF PRESENT ILLNESS
[FreeTextEntry1] : Patients presents today as a new patient for diabetic evaluation and foot assessment; Patient currently has no pedal complaints, but would like to get some diabetic shoes and nails evaluated. \par Patient says his last A1c;  6.7% in August, 2019\par Patient currently denies F/N/V and shortness of breath\par \par

## 2020-02-28 NOTE — PROCEDURE
[FreeTextEntry1] : Diabetic Evaluation and Foot Assessment\par New Patient\par Legally Blind \par \par Patient was seen and evaluated. All questions and concerns were addressed and answered\par Nails Debrided x10; B/L\par Rx'd Amlactin; Apply Daily  \par Rx'd Custom Orthotics; Will Call Patient if insurance is covered\par Patient educated on diabetic cautions and risk to the lower extremity\par Patient told to follow up in 1 year or sooner if problems or questions arise

## 2020-03-03 ENCOUNTER — APPOINTMENT (OUTPATIENT)
Dept: DERMATOLOGY | Facility: CLINIC | Age: 55
End: 2020-03-03
Payer: MEDICAID

## 2020-03-03 ENCOUNTER — OUTPATIENT (OUTPATIENT)
Dept: OUTPATIENT SERVICES | Facility: HOSPITAL | Age: 55
LOS: 1 days | Discharge: HOME | End: 2020-03-03

## 2020-03-03 VITALS
WEIGHT: 227 LBS | BODY MASS INDEX: 35.63 KG/M2 | DIASTOLIC BLOOD PRESSURE: 88 MMHG | HEIGHT: 67 IN | SYSTOLIC BLOOD PRESSURE: 147 MMHG

## 2020-03-03 DIAGNOSIS — L85.3 XEROSIS CUTIS: ICD-10-CM

## 2020-03-03 DIAGNOSIS — B35.4 TINEA CORPORIS: ICD-10-CM

## 2020-03-03 DIAGNOSIS — Z98.890 OTHER SPECIFIED POSTPROCEDURAL STATES: Chronic | ICD-10-CM

## 2020-03-03 DIAGNOSIS — L30.9 DERMATITIS, UNSPECIFIED: ICD-10-CM

## 2020-03-03 DIAGNOSIS — Z94.7 CORNEAL TRANSPLANT STATUS: Chronic | ICD-10-CM

## 2020-03-03 PROCEDURE — 99212 OFFICE O/P EST SF 10 MIN: CPT | Mod: GC

## 2020-03-03 NOTE — REVIEW OF SYSTEMS
[Fever] : no fever [Chills] : no chills [Night Sweats] : no night sweats [Headache] : no headache [Vomiting] : no vomiting [Shortness Of Breath] : no shortness of breath [Cough] : no cough

## 2020-03-03 NOTE — ASSESSMENT
[FreeTextEntry1] : 51 yo M presents to have acrochordons removed.\par \par Skin rash- erythematous, crusted -  nummular dermatitis \par - lesion without central clearing not annular\par - worse with stress and cold weather\par - Triamcinolone and the Eucerin\par \par 2. Acrochordon\par -was treated with liquid nitrogen in 2017\par - lesion to left upper chest persists\par - will perform liquid nitrogen treatment in office

## 2020-03-03 NOTE — HISTORY OF PRESENT ILLNESS
[de-identified] : 53 yo M with visual impairment presents for itchy skin rash . Pt states the first lesion started on the R shin about 2 months ago after getting Sharpa comforter. Then lesions then spread to contralateral leg, thigh and stomach and chest. Pt reports trying Eucerin Eczema, Gold bond, Hydrocortisone, Cetaphil moisturizer which helped somewhat but not completely. Reports stress ( he is taking care of sick dog) and cold weather make it worse.  Saw PMD who prescribed antifungal Miconazole for possible Tinea corporis  with limited improvement ( pt also states he ran out of medications). Patient denies any hx of eczema, but does report allergies to dust.  [FreeTextEntry1] : Skin tags

## 2020-03-20 ENCOUNTER — APPOINTMENT (OUTPATIENT)
Dept: INTERNAL MEDICINE | Facility: CLINIC | Age: 55
End: 2020-03-20

## 2020-04-24 ENCOUNTER — APPOINTMENT (OUTPATIENT)
Dept: INTERNAL MEDICINE | Facility: CLINIC | Age: 55
End: 2020-04-24

## 2020-05-18 LAB
25(OH)D3 SERPL-MCNC: 52 NG/ML
ALBUMIN SERPL ELPH-MCNC: 4.5 G/DL
ALP BLD-CCNC: 80 U/L
ALT SERPL-CCNC: 33 U/L
ANION GAP SERPL CALC-SCNC: 16 MMOL/L
AST SERPL-CCNC: 16 U/L
BASOPHILS # BLD AUTO: 0.03 K/UL
BASOPHILS NFR BLD AUTO: 0.4 %
BILIRUB SERPL-MCNC: 0.4 MG/DL
BUN SERPL-MCNC: 8 MG/DL
CALCIUM SERPL-MCNC: 9.7 MG/DL
CHLORIDE SERPL-SCNC: 101 MMOL/L
CHOLEST SERPL-MCNC: 144 MG/DL
CHOLEST/HDLC SERPL: 3.4 RATIO
CO2 SERPL-SCNC: 25 MMOL/L
CREAT SERPL-MCNC: 0.7 MG/DL
CREAT SPEC-SCNC: 79 MG/DL
EOSINOPHIL # BLD AUTO: 0.17 K/UL
EOSINOPHIL NFR BLD AUTO: 2 %
ESTIMATED AVERAGE GLUCOSE: 140 MG/DL
GLUCOSE SERPL-MCNC: 126 MG/DL
HBA1C MFR BLD HPLC: 6.5 %
HCT VFR BLD CALC: 46 %
HDLC SERPL-MCNC: 42 MG/DL
HGB BLD-MCNC: 14.8 G/DL
IMM GRANULOCYTES NFR BLD AUTO: 0.2 %
LDLC SERPL CALC-MCNC: 89 MG/DL
LYMPHOCYTES # BLD AUTO: 2.26 K/UL
LYMPHOCYTES NFR BLD AUTO: 26.6 %
MAN DIFF?: NORMAL
MCHC RBC-ENTMCNC: 28.1 PG
MCHC RBC-ENTMCNC: 32.2 G/DL
MCV RBC AUTO: 87.3 FL
MICROALBUMIN 24H UR DL<=1MG/L-MCNC: <1.2 MG/DL
MICROALBUMIN/CREAT 24H UR-RTO: NORMAL MG/G
MONOCYTES # BLD AUTO: 0.59 K/UL
MONOCYTES NFR BLD AUTO: 6.9 %
NEUTROPHILS # BLD AUTO: 5.42 K/UL
NEUTROPHILS NFR BLD AUTO: 63.9 %
PLATELET # BLD AUTO: 278 K/UL
POTASSIUM SERPL-SCNC: 4.3 MMOL/L
PROT SERPL-MCNC: 7 G/DL
RBC # BLD: 5.27 M/UL
RBC # FLD: 13.2 %
SODIUM SERPL-SCNC: 142 MMOL/L
TRIGL SERPL-MCNC: 132 MG/DL
TSH SERPL-ACNC: 0.98 UIU/ML
WBC # FLD AUTO: 8.49 K/UL

## 2020-05-25 ENCOUNTER — RX RENEWAL (OUTPATIENT)
Age: 55
End: 2020-05-25

## 2020-07-27 ENCOUNTER — OUTPATIENT (OUTPATIENT)
Dept: OUTPATIENT SERVICES | Facility: HOSPITAL | Age: 55
LOS: 1 days | Discharge: HOME | End: 2020-07-27

## 2020-07-27 ENCOUNTER — APPOINTMENT (OUTPATIENT)
Dept: INTERNAL MEDICINE | Facility: CLINIC | Age: 55
End: 2020-07-27
Payer: COMMERCIAL

## 2020-07-27 VITALS
BODY MASS INDEX: 35 KG/M2 | DIASTOLIC BLOOD PRESSURE: 91 MMHG | TEMPERATURE: 97.1 F | SYSTOLIC BLOOD PRESSURE: 166 MMHG | HEART RATE: 83 BPM | HEIGHT: 67 IN | WEIGHT: 223 LBS

## 2020-07-27 VITALS — HEART RATE: 75 BPM | SYSTOLIC BLOOD PRESSURE: 161 MMHG | DIASTOLIC BLOOD PRESSURE: 88 MMHG

## 2020-07-27 DIAGNOSIS — E55.9 VITAMIN D DEFICIENCY, UNSPECIFIED: ICD-10-CM

## 2020-07-27 DIAGNOSIS — Z98.890 OTHER SPECIFIED POSTPROCEDURAL STATES: Chronic | ICD-10-CM

## 2020-07-27 DIAGNOSIS — Z94.7 CORNEAL TRANSPLANT STATUS: Chronic | ICD-10-CM

## 2020-07-27 PROCEDURE — 99214 OFFICE O/P EST MOD 30 MIN: CPT | Mod: GC

## 2020-07-27 RX ORDER — ADHESIVE TAPE 3"X 2.3 YD
50 MCG TAPE, NON-MEDICATED TOPICAL
Qty: 90 | Refills: 2 | Status: DISCONTINUED | COMMUNITY
Start: 2020-02-03 | End: 2020-07-27

## 2020-07-27 NOTE — PLAN
[FreeTextEntry1] : 55M with HTN, DM II, DLD, legally blind due to bilateral glaucoma and chronic back pain, depression.\par Presenting for follow up and back pain\par \par # back pain - musculoskeletal. \par -pt referral \par -c/w muscles relaxant \par \par #Skin rash- erythematous, crusted - nummular dermatitis -resolved \par - Triamcinolone and the Eucerin\par following with dermatology \par \par #. Acrochordon\par -was treated with liquid nitrogen in 2017\par - lesion to left upper chest persists\par -following with deramtology \par \par # Vit D def\par - repeat level 52\par -stop vitamind D supplement\par \par #DMII: controlled \par - Continue with metformin\par - podiatry following \par \par \par #bilateral glaucoma/ legally blind\par cont eye drops\par referral to opthalmology but he did not go last visit\par \par #HTN: elevated today, white coat hypertension, controlled ay home\par -norvasc 10\par -irebesartan/ hctz 300/12.5\par -asked pt to bring with him his machine next time.\par \par #DLD:\par - c/w Lipitor\par - repeat lipid panel \par \par #Depression -resolved \par \par #Polyps (tubular adenomas + irregular suspicious sigmoid polyp): \par - follow up with GI in 2021\par \par HCM:\par - colonoscopy in 2021. \par - f/u in 6 months and prn\par \par . \par

## 2020-07-27 NOTE — REVIEW OF SYSTEMS
[Vision Problems] : vision problems [Fever] : no fever [Chills] : no chills [Chest Pain] : no chest pain [Shortness Of Breath] : no shortness of breath [Palpitations] : no palpitations [Orthopena] : no orthopnea [Incontinence] : no incontinence [Wheezing] : no wheezing [Dysuria] : no dysuria [Hematuria] : no hematuria [Frequency] : no frequency

## 2020-07-27 NOTE — PHYSICAL EXAM
[No Acute Distress] : no acute distress [Well Nourished] : well nourished [No JVD] : no jugular venous distention [No Lymphadenopathy] : no lymphadenopathy [No Respiratory Distress] : no respiratory distress  [No Accessory Muscle Use] : no accessory muscle use [Clear to Auscultation] : lungs were clear to auscultation bilaterally [Normal Rate] : normal rate  [Regular Rhythm] : with a regular rhythm [Normal S1, S2] : normal S1 and S2 [Soft] : abdomen soft [Non Tender] : non-tender [Non-distended] : non-distended [Normal Bowel Sounds] : normal bowel sounds [No HSM] : no HSM [No Joint Swelling] : no joint swelling [Grossly Normal Strength/Tone] : grossly normal strength/tone [Memory Grossly Normal] : memory grossly normal [Normal Affect] : the affect was normal [Alert and Oriented x3] : oriented to person, place, and time [Normal Mood] : the mood was normal [de-identified] : blind bilaterally

## 2020-07-27 NOTE — HISTORY OF PRESENT ILLNESS
[FreeTextEntry1] : 55M with HTN, DM II , DLD, legally blind due to bilateral glaucoma and chronic back pain, depression.\par Presenting for follow up [de-identified] : 55M with HTN, DM  DLD, legally blind due to bilateral glaucoma and chronic back pain, depression.\par Presenting for follow up and lower back pain, he did not go to PT because of COVID, he is  in the [place], blood pressure 120s/85s

## 2020-07-28 DIAGNOSIS — K63.5 POLYP OF COLON: ICD-10-CM

## 2020-07-28 DIAGNOSIS — I10 ESSENTIAL (PRIMARY) HYPERTENSION: ICD-10-CM

## 2020-07-28 DIAGNOSIS — E11.9 TYPE 2 DIABETES MELLITUS WITHOUT COMPLICATIONS: ICD-10-CM

## 2020-07-28 DIAGNOSIS — H54.8 LEGAL BLINDNESS, AS DEFINED IN USA: ICD-10-CM

## 2020-07-28 DIAGNOSIS — M54.9 DORSALGIA, UNSPECIFIED: ICD-10-CM

## 2020-07-28 DIAGNOSIS — E55.9 VITAMIN D DEFICIENCY, UNSPECIFIED: ICD-10-CM

## 2020-09-30 ENCOUNTER — TRANSCRIPTION ENCOUNTER (OUTPATIENT)
Age: 55
End: 2020-09-30

## 2020-12-14 ENCOUNTER — APPOINTMENT (OUTPATIENT)
Dept: INTERNAL MEDICINE | Facility: CLINIC | Age: 55
End: 2020-12-14
Payer: COMMERCIAL

## 2020-12-14 ENCOUNTER — OUTPATIENT (OUTPATIENT)
Dept: OUTPATIENT SERVICES | Facility: HOSPITAL | Age: 55
LOS: 1 days | Discharge: HOME | End: 2020-12-14

## 2020-12-14 VITALS
SYSTOLIC BLOOD PRESSURE: 167 MMHG | HEART RATE: 69 BPM | DIASTOLIC BLOOD PRESSURE: 92 MMHG | HEIGHT: 78 IN | BODY MASS INDEX: 25.8 KG/M2 | WEIGHT: 223 LBS | TEMPERATURE: 97.2 F

## 2020-12-14 DIAGNOSIS — K63.5 POLYP OF COLON: ICD-10-CM

## 2020-12-14 DIAGNOSIS — E78.5 HYPERLIPIDEMIA, UNSPECIFIED: ICD-10-CM

## 2020-12-14 DIAGNOSIS — Z94.7 CORNEAL TRANSPLANT STATUS: Chronic | ICD-10-CM

## 2020-12-14 DIAGNOSIS — Z98.890 OTHER SPECIFIED POSTPROCEDURAL STATES: Chronic | ICD-10-CM

## 2020-12-14 DIAGNOSIS — I10 ESSENTIAL (PRIMARY) HYPERTENSION: ICD-10-CM

## 2020-12-14 DIAGNOSIS — E11.9 TYPE 2 DIABETES MELLITUS WITHOUT COMPLICATIONS: ICD-10-CM

## 2020-12-14 PROCEDURE — 99213 OFFICE O/P EST LOW 20 MIN: CPT | Mod: GC

## 2020-12-14 NOTE — PHYSICAL EXAM
[No Acute Distress] : no acute distress [Well Nourished] : well nourished [Well Developed] : well developed [Well-Appearing] : well-appearing [Normal Sclera/Conjunctiva] : normal sclera/conjunctiva [PERRL] : pupils equal round and reactive to light [EOMI] : extraocular movements intact [Normal Outer Ear/Nose] : the outer ears and nose were normal in appearance [Normal Oropharynx] : the oropharynx was normal [No JVD] : no jugular venous distention [No Lymphadenopathy] : no lymphadenopathy [Supple] : supple [Thyroid Normal, No Nodules] : the thyroid was normal and there were no nodules present [No Respiratory Distress] : no respiratory distress  [No Accessory Muscle Use] : no accessory muscle use [Clear to Auscultation] : lungs were clear to auscultation bilaterally [Normal Rate] : normal rate  [Regular Rhythm] : with a regular rhythm [Normal S1, S2] : normal S1 and S2 [No Murmur] : no murmur heard [No Carotid Bruits] : no carotid bruits [No Abdominal Bruit] : a ~M bruit was not heard ~T in the abdomen [No Varicosities] : no varicosities [Pedal Pulses Present] : the pedal pulses are present [No Edema] : there was no peripheral edema [No Palpable Aorta] : no palpable aorta [No Extremity Clubbing/Cyanosis] : no extremity clubbing/cyanosis [Soft] : abdomen soft [Non Tender] : non-tender [Non-distended] : non-distended [No Masses] : no abdominal mass palpated [No HSM] : no HSM [Normal Bowel Sounds] : normal bowel sounds [Normal Posterior Cervical Nodes] : no posterior cervical lymphadenopathy [Normal Anterior Cervical Nodes] : no anterior cervical lymphadenopathy [No CVA Tenderness] : no CVA  tenderness [No Spinal Tenderness] : no spinal tenderness [No Joint Swelling] : no joint swelling [Grossly Normal Strength/Tone] : grossly normal strength/tone [No Rash] : no rash [Coordination Grossly Intact] : coordination grossly intact [No Focal Deficits] : no focal deficits [Normal Gait] : normal gait [Normal Affect] : the affect was normal [Normal Insight/Judgement] : insight and judgment were intact [de-identified] : legally blind

## 2020-12-14 NOTE — HISTORY OF PRESENT ILLNESS
[FreeTextEntry1] : follow up visit  [de-identified] : 55M with HTN, DM DLD, legally blind due to bilateral glaucoma and chronic back pain, depression.\par Presenting for follow up today he states that  back pain has improved with PT. He states today he needs medication refills and   blood work

## 2021-02-11 ENCOUNTER — OUTPATIENT (OUTPATIENT)
Dept: OUTPATIENT SERVICES | Facility: HOSPITAL | Age: 56
LOS: 1 days | Discharge: HOME | End: 2021-02-11

## 2021-02-11 DIAGNOSIS — M54.5 LOW BACK PAIN: ICD-10-CM

## 2021-02-11 DIAGNOSIS — M54.2 CERVICALGIA: ICD-10-CM

## 2021-02-11 DIAGNOSIS — Z94.7 CORNEAL TRANSPLANT STATUS: Chronic | ICD-10-CM

## 2021-02-11 DIAGNOSIS — Z98.890 OTHER SPECIFIED POSTPROCEDURAL STATES: Chronic | ICD-10-CM

## 2021-05-05 ENCOUNTER — APPOINTMENT (OUTPATIENT)
Dept: PODIATRY | Facility: CLINIC | Age: 56
End: 2021-05-05
Payer: COMMERCIAL

## 2021-05-05 ENCOUNTER — OUTPATIENT (OUTPATIENT)
Dept: OUTPATIENT SERVICES | Facility: HOSPITAL | Age: 56
LOS: 1 days | Discharge: HOME | End: 2021-05-05

## 2021-05-05 DIAGNOSIS — Z94.7 CORNEAL TRANSPLANT STATUS: Chronic | ICD-10-CM

## 2021-05-05 DIAGNOSIS — Z98.890 OTHER SPECIFIED POSTPROCEDURAL STATES: Chronic | ICD-10-CM

## 2021-05-05 PROCEDURE — 99213 OFFICE O/P EST LOW 20 MIN: CPT

## 2021-05-07 NOTE — PROCEDURE
[FreeTextEntry1] : -Loss of protective sensation: yes  \par -Presence of foot deformity:    no \par -presence of pre-ulcerative lesion:  no\par   -hemorrhage into callus:  no\par -atrophy of heel or metatarsal fat pads:  no\par \par -Diabetic neurovascular foot assessment  performed. \par -Discussed with patient diabetic foot hygiene.Patient instructed to regularly check the bottom of the feet\par -Patient given a diabetic foot education sheet\par Patient given a prescription for diabetic shoes. Patient would benefit from supportive diabetic shoes that would support the medial longitudinal arch. Molded inserts are indicated to reduce pressure on the plantar foot to prevent ulcerations. Shoes are indicated based on patients diagnosis of peripheral neuropathy.\par \par -Return 6 month\par

## 2021-05-07 NOTE — PHYSICAL EXAM
[General Appearance - Alert] : alert [General Appearance - In No Acute Distress] : in no acute distress [Skin Lesions] : no skin lesions [Oriented To Time, Place, And Person] : oriented to person, place, and time [Impaired Insight] : insight and judgment were intact [Vibration Dec.] : normal vibratory sensation at the level of the toes [Diminished Throughout Right Foot] : normal tactile sensation with monofilament testing throughout right foot [Diminished Throughout Left Foot] : normal tactile sensation with monofilament testing throughout left foot [FreeTextEntry1] : Elongated Nails; B/L; No Open Lesions or Abnormalities Noted

## 2021-05-07 NOTE — HISTORY OF PRESENT ILLNESS
[FreeTextEntry1] : 55 year old M  presents for a diabetic foot evaluation. Mr. NIEVES relates intermittent tingling burning in his feet. Last A1c was 6.5% 5/2020\par \par \par

## 2021-08-04 ENCOUNTER — NON-APPOINTMENT (OUTPATIENT)
Age: 56
End: 2021-08-04

## 2021-08-04 ENCOUNTER — OUTPATIENT (OUTPATIENT)
Dept: OUTPATIENT SERVICES | Facility: HOSPITAL | Age: 56
LOS: 1 days | Discharge: HOME | End: 2021-08-04

## 2021-08-04 ENCOUNTER — APPOINTMENT (OUTPATIENT)
Dept: INTERNAL MEDICINE | Facility: CLINIC | Age: 56
End: 2021-08-04
Payer: COMMERCIAL

## 2021-08-04 VITALS
DIASTOLIC BLOOD PRESSURE: 94 MMHG | OXYGEN SATURATION: 97 % | TEMPERATURE: 98.4 F | WEIGHT: 221 LBS | HEART RATE: 72 BPM | BODY MASS INDEX: 25.57 KG/M2 | SYSTOLIC BLOOD PRESSURE: 166 MMHG | HEIGHT: 78 IN

## 2021-08-04 VITALS — DIASTOLIC BLOOD PRESSURE: 87 MMHG | SYSTOLIC BLOOD PRESSURE: 151 MMHG

## 2021-08-04 DIAGNOSIS — I10 ESSENTIAL (PRIMARY) HYPERTENSION: ICD-10-CM

## 2021-08-04 DIAGNOSIS — E78.5 HYPERLIPIDEMIA, UNSPECIFIED: ICD-10-CM

## 2021-08-04 DIAGNOSIS — Z98.890 OTHER SPECIFIED POSTPROCEDURAL STATES: Chronic | ICD-10-CM

## 2021-08-04 DIAGNOSIS — M54.9 DORSALGIA, UNSPECIFIED: ICD-10-CM

## 2021-08-04 DIAGNOSIS — Z94.7 CORNEAL TRANSPLANT STATUS: Chronic | ICD-10-CM

## 2021-08-04 DIAGNOSIS — E11.9 TYPE 2 DIABETES MELLITUS WITHOUT COMPLICATIONS: ICD-10-CM

## 2021-08-04 DIAGNOSIS — H54.8 LEGAL BLINDNESS, AS DEFINED IN USA: ICD-10-CM

## 2021-08-04 PROCEDURE — 99213 OFFICE O/P EST LOW 20 MIN: CPT | Mod: GC

## 2021-08-04 NOTE — HISTORY OF PRESENT ILLNESS
[de-identified] : 55M with HTN, DM DLD, legally blind due to bilateral glaucoma and chronic back pain, depression.\par Patient reports he feels better. his back pain is better with the help of a chiropractor and with PT. no chest pain, dyspnea, abd pain, or other symptoms. patient saw podiatrist. he said his bp is controlled at home. no other symptoms. non smoker. social alcohol

## 2021-08-04 NOTE — ASSESSMENT
[FreeTextEntry1] : 55M with HTN, DM II, DLD, legally blind due to bilateral glaucoma and chronic back pain, depression.\par Presenting for follow up and back pain\par \par # back pain - musculoskeletal now improved \par - c/w PT and chiropractor\par -c/w muscles relaxant PRN \par \par \par #DMII: controlled but HBA1C increased to 7.3\par - increase metformin to 1 gram bid\par - podiatry following \par - A1C before next visit\par \par #bilateral glaucoma/ legally blind\par cont eye drops\par continue following opthalmology \par \par #HTN: elevated today, white coat hypertension, controlled ay home\par -norvasc 10\par -irebesartan/ hctz 300/12.5\par \par \par #DLD:\par - c/w Lipitor\par - repeat lipid panel noted normal\par \par \par #Polyps (tubular adenomas + irregular suspicious sigmoid polyp): \par - follow up with GI in 2022\par \par HCM:\par - colonoscopy in 2022.\par rto in 6 months and prn.

## 2021-12-08 LAB
25(OH)D3 SERPL-MCNC: 35 NG/ML
ALBUMIN SERPL ELPH-MCNC: 4.9 G/DL
ALP BLD-CCNC: 112 U/L
ALT SERPL-CCNC: 43 U/L
ANION GAP SERPL CALC-SCNC: 16 MMOL/L
AST SERPL-CCNC: 21 U/L
BASOPHILS # BLD AUTO: 0.03 K/UL
BASOPHILS NFR BLD AUTO: 0.3 %
BILIRUB SERPL-MCNC: 0.7 MG/DL
BUN SERPL-MCNC: 12 MG/DL
CALCIUM SERPL-MCNC: 10 MG/DL
CHLORIDE SERPL-SCNC: 98 MMOL/L
CHOLEST SERPL-MCNC: 146 MG/DL
CO2 SERPL-SCNC: 27 MMOL/L
COVID-19 NUCLEOCAPSID  GAM ANTIBODY INTERPRETATION: POSITIVE
CREAT SERPL-MCNC: 0.9 MG/DL
CREAT SPEC-SCNC: 16 MG/DL
EOSINOPHIL # BLD AUTO: 0.45 K/UL
EOSINOPHIL NFR BLD AUTO: 4.9 %
ESTIMATED AVERAGE GLUCOSE: 163 MG/DL
GLUCOSE SERPL-MCNC: 120 MG/DL
HBA1C MFR BLD HPLC: 7.3 %
HCT VFR BLD CALC: 45.8 %
HDLC SERPL-MCNC: 39 MG/DL
HGB BLD-MCNC: 15.6 G/DL
IMM GRANULOCYTES NFR BLD AUTO: 0.3 %
LDLC SERPL CALC-MCNC: 88 MG/DL
LYMPHOCYTES # BLD AUTO: 2.53 K/UL
LYMPHOCYTES NFR BLD AUTO: 27.7 %
MAGNESIUM SERPL-MCNC: 2.1 MG/DL
MAN DIFF?: NORMAL
MCHC RBC-ENTMCNC: 28.8 PG
MCHC RBC-ENTMCNC: 34.1 G/DL
MCV RBC AUTO: 84.5 FL
MICROALBUMIN 24H UR DL<=1MG/L-MCNC: <1.2 MG/DL
MICROALBUMIN/CREAT 24H UR-RTO: NORMAL MG/G
MONOCYTES # BLD AUTO: 0.58 K/UL
MONOCYTES NFR BLD AUTO: 6.3 %
NEUTROPHILS # BLD AUTO: 5.52 K/UL
NEUTROPHILS NFR BLD AUTO: 60.5 %
NONHDLC SERPL-MCNC: 107 MG/DL
PLATELET # BLD AUTO: 281 K/UL
POTASSIUM SERPL-SCNC: 3.7 MMOL/L
PROT SERPL-MCNC: 7.8 G/DL
RBC # BLD: 5.42 M/UL
RBC # FLD: 12.6 %
SARS-COV-2 AB SERPL QL IA: 25.6 INDEX
SODIUM SERPL-SCNC: 141 MMOL/L
TRIGL SERPL-MCNC: 125 MG/DL
TSH SERPL-ACNC: 1.09 UIU/ML
WBC # FLD AUTO: 9.14 K/UL

## 2022-02-04 LAB
ESTIMATED AVERAGE GLUCOSE: 143 MG/DL
HBA1C MFR BLD HPLC: 6.6 %

## 2022-02-09 ENCOUNTER — OUTPATIENT (OUTPATIENT)
Dept: OUTPATIENT SERVICES | Facility: HOSPITAL | Age: 57
LOS: 1 days | Discharge: HOME | End: 2022-02-09

## 2022-02-09 ENCOUNTER — APPOINTMENT (OUTPATIENT)
Dept: INTERNAL MEDICINE | Facility: CLINIC | Age: 57
End: 2022-02-09
Payer: COMMERCIAL

## 2022-02-09 ENCOUNTER — NON-APPOINTMENT (OUTPATIENT)
Age: 57
End: 2022-02-09

## 2022-02-09 VITALS
DIASTOLIC BLOOD PRESSURE: 88 MMHG | OXYGEN SATURATION: 99 % | TEMPERATURE: 98.1 F | SYSTOLIC BLOOD PRESSURE: 161 MMHG | BODY MASS INDEX: 25.92 KG/M2 | WEIGHT: 224 LBS | HEART RATE: 73 BPM | HEIGHT: 78 IN

## 2022-02-09 VITALS — DIASTOLIC BLOOD PRESSURE: 84 MMHG | SYSTOLIC BLOOD PRESSURE: 160 MMHG

## 2022-02-09 DIAGNOSIS — Z98.890 OTHER SPECIFIED POSTPROCEDURAL STATES: Chronic | ICD-10-CM

## 2022-02-09 DIAGNOSIS — Z94.7 CORNEAL TRANSPLANT STATUS: Chronic | ICD-10-CM

## 2022-02-09 PROCEDURE — 99213 OFFICE O/P EST LOW 20 MIN: CPT | Mod: GC

## 2022-02-09 NOTE — ASSESSMENT
[FreeTextEntry1] : 55M with HTN, DM II, DLD, legally blind due to bilateral glaucoma and chronic back pain, depression.\par Presenting for follow up and voice hoarseness\par \par #Voice hoarseness\par - could be due to acid reflux\par - ENT referral\par \par # back pain - musculoskeletal now improved \par - c/w methocarbamol prn\par - PT, OT \par \par #DMII - controlled\par - a1c improved from 7.5 to 6.6\par - c/w metformin 1 gram bid\par - podiatry following \par - A1C before next visit\par \par #bilateral glaucoma/ legally blind\par cont eye drops\par continue following opthalmology \par \par #HTN: elevated today, white coat hypertension, controlled at home\par -norvasc 10\par -irebesartan/ hctz 300/12.5\par - pt educated to bring his home bp machine in next visit\par \par \par #DLD:\par - c/w Lipitor\par \par #Polyps (tubular adenomas + irregular suspicious sigmoid polyp): \par - follow up with GI Dr. Porras this year\par \par HCM:\par - colonoscopy in 2022.\par - rto in 6 months with blood work and prn. \par \par

## 2022-02-09 NOTE — HISTORY OF PRESENT ILLNESS
[FreeTextEntry1] : followup [de-identified] : 56M with HTN, DM DLD, legally blind due to bilateral glaucoma and chronic back pain, depression.\par Patient reports he feels better. his back pain is better with the help of a chiropractor  but requested PT, OT. no chest pain, dyspnea, abd pain, or other symptoms. he said his bp is controlled at home. no other symptoms. non smoker. social alcohol \par Complaining of voice hoarseness

## 2022-02-10 DIAGNOSIS — R49.0 DYSPHONIA: ICD-10-CM

## 2022-02-10 DIAGNOSIS — I10 ESSENTIAL (PRIMARY) HYPERTENSION: ICD-10-CM

## 2022-02-10 DIAGNOSIS — E11.9 TYPE 2 DIABETES MELLITUS WITHOUT COMPLICATIONS: ICD-10-CM

## 2022-03-29 ENCOUNTER — APPOINTMENT (OUTPATIENT)
Dept: OTOLARYNGOLOGY | Facility: CLINIC | Age: 57
End: 2022-03-29
Payer: COMMERCIAL

## 2022-03-29 VITALS — HEIGHT: 68 IN | BODY MASS INDEX: 33.34 KG/M2 | WEIGHT: 220 LBS

## 2022-03-29 DIAGNOSIS — J30.9 ALLERGIC RHINITIS, UNSPECIFIED: ICD-10-CM

## 2022-03-29 PROCEDURE — 31575 DIAGNOSTIC LARYNGOSCOPY: CPT

## 2022-03-29 PROCEDURE — 99204 OFFICE O/P NEW MOD 45 MIN: CPT | Mod: 25

## 2022-03-29 PROCEDURE — 99072 ADDL SUPL MATRL&STAF TM PHE: CPT

## 2022-03-29 NOTE — HISTORY OF PRESENT ILLNESS
[de-identified] : Patient presents today c/o hoarseness .  Patient is a oconnell and suffers from allergies . Occasionally his voice gets very hoarse .  Pt sings in his [employer] choir. He also has some excess nasal mucus  and post nasal drip.  Patient is currently taking Claritin. Medical notes were reviewed.

## 2022-03-29 NOTE — PROCEDURE
[Hoarseness] : hoarseness not clearly evaluated by indirect laryngoscopy [Flexible Endoscope] : examined with the flexible endoscope [True Vocal Cords Erythematous] : bilateral true vocal cord edema [Glottis Arytenoid Cartilages Erythema] : bilateral arytenoid ~M erythema [Normal] : posterior cricoid area had healthy pink mucosa in the interarytenoid area and the esophageal inlet

## 2022-04-28 RX ORDER — OMEPRAZOLE 40 MG/1
40 CAPSULE, DELAYED RELEASE ORAL
Qty: 30 | Refills: 2 | Status: DISCONTINUED | COMMUNITY
Start: 2022-02-16 | End: 2022-04-28

## 2022-05-19 ENCOUNTER — APPOINTMENT (OUTPATIENT)
Dept: OTOLARYNGOLOGY | Facility: CLINIC | Age: 57
End: 2022-05-19

## 2022-06-08 ENCOUNTER — APPOINTMENT (OUTPATIENT)
Dept: GASTROENTEROLOGY | Facility: CLINIC | Age: 57
End: 2022-06-08

## 2022-07-20 ENCOUNTER — APPOINTMENT (OUTPATIENT)
Dept: GASTROENTEROLOGY | Facility: CLINIC | Age: 57
End: 2022-07-20

## 2022-07-20 VITALS — BODY MASS INDEX: 33.98 KG/M2 | HEIGHT: 68 IN | WEIGHT: 224.2 LBS

## 2022-07-20 PROCEDURE — 99214 OFFICE O/P EST MOD 30 MIN: CPT

## 2022-07-20 NOTE — ASSESSMENT
[FreeTextEntry1] : Patient is a 58 y/o male with PMHx of HTN, HLD, prior Colonic polyps with previous EMR here for setup for colonoscopy. \par \par Colon Cancer Screening\par - Risk and benefit explained in detail to the patient\par - Clear liquid diet the day before the procedure\par - Finish all of the prescribed prep as ordered to insure good visualization\par - Please refrain from any NSAID use including ASA one week before\par - Follow up 3-4 weeks after the procedure stressed to insure follow up on pathology and planning for future screenings

## 2022-07-20 NOTE — PHYSICAL EXAM
[General Appearance - Alert] : alert [General Appearance - In No Acute Distress] : in no acute distress [Auscultation Breath Sounds / Voice Sounds] : lungs were clear to auscultation bilaterally [Heart Rate And Rhythm] : heart rate was normal and rhythm regular [Heart Sounds] : normal S1 and S2 [Heart Sounds Gallop] : no gallops [Murmurs] : no murmurs [Heart Sounds Pericardial Friction Rub] : no pericardial rub [Bowel Sounds] : normal bowel sounds [Abdomen Soft] : soft [Abdomen Tenderness] : non-tender [] : no hepato-splenomegaly [Abdomen Mass (___ Cm)] : no abdominal mass palpated [Abnormal Walk] : normal gait [Nail Clubbing] : no clubbing  or cyanosis of the fingernails [Musculoskeletal - Swelling] : no joint swelling seen [Motor Tone] : muscle strength and tone were normal [Deep Tendon Reflexes (DTR)] : deep tendon reflexes were 2+ and symmetric [Sensation] : the sensory exam was normal to light touch and pinprick [No Focal Deficits] : no focal deficits [Outer Ear] : the ears and nose were normal in appearance [Neck Appearance] : the appearance of the neck was normal [No CVA Tenderness] : no ~M costovertebral angle tenderness [Skin Color & Pigmentation] : normal skin color and pigmentation [Oriented To Time, Place, And Person] : oriented to person, place, and time

## 2022-07-20 NOTE — HISTORY OF PRESENT ILLNESS
[de-identified] : Patient is a 58 y/o male with PMHx of HTN, HLD, prior Colonic polyps with previous EMR here for setup for colonoscopy.

## 2022-08-10 ENCOUNTER — APPOINTMENT (OUTPATIENT)
Dept: INTERNAL MEDICINE | Facility: CLINIC | Age: 57
End: 2022-08-10

## 2022-08-10 ENCOUNTER — RX RENEWAL (OUTPATIENT)
Age: 57
End: 2022-08-10

## 2022-08-17 LAB
25(OH)D3 SERPL-MCNC: 48 NG/ML
ALBUMIN SERPL ELPH-MCNC: 4.6 G/DL
ALP BLD-CCNC: 82 U/L
ALT SERPL-CCNC: 57 U/L
ANION GAP SERPL CALC-SCNC: 15 MMOL/L
AST SERPL-CCNC: 28 U/L
BASOPHILS # BLD AUTO: 0.03 K/UL
BASOPHILS NFR BLD AUTO: 0.4 %
BILIRUB SERPL-MCNC: 0.5 MG/DL
BUN SERPL-MCNC: 13 MG/DL
CALCIUM SERPL-MCNC: 9.7 MG/DL
CHLORIDE SERPL-SCNC: 101 MMOL/L
CHOLEST SERPL-MCNC: 167 MG/DL
CO2 SERPL-SCNC: 24 MMOL/L
CREAT SERPL-MCNC: 0.8 MG/DL
EGFR: 103 ML/MIN/1.73M2
EOSINOPHIL # BLD AUTO: 0.18 K/UL
EOSINOPHIL NFR BLD AUTO: 2.2 %
GLUCOSE SERPL-MCNC: 105 MG/DL
HCT VFR BLD CALC: 45.1 %
HDLC SERPL-MCNC: 36 MG/DL
HGB BLD-MCNC: 15.1 G/DL
IMM GRANULOCYTES NFR BLD AUTO: 0.4 %
LDLC SERPL CALC-MCNC: 99 MG/DL
LYMPHOCYTES # BLD AUTO: 2.38 K/UL
LYMPHOCYTES NFR BLD AUTO: 29.5 %
MAN DIFF?: NORMAL
MCHC RBC-ENTMCNC: 28.3 PG
MCHC RBC-ENTMCNC: 33.5 G/DL
MCV RBC AUTO: 84.5 FL
MONOCYTES # BLD AUTO: 0.62 K/UL
MONOCYTES NFR BLD AUTO: 7.7 %
NEUTROPHILS # BLD AUTO: 4.83 K/UL
NEUTROPHILS NFR BLD AUTO: 59.8 %
NONHDLC SERPL-MCNC: 131 MG/DL
PLATELET # BLD AUTO: 231 K/UL
POTASSIUM SERPL-SCNC: 3.7 MMOL/L
PROT SERPL-MCNC: 7 G/DL
RBC # BLD: 5.34 M/UL
RBC # FLD: 12.5 %
SODIUM SERPL-SCNC: 140 MMOL/L
TRIGL SERPL-MCNC: 162 MG/DL
TSH SERPL-ACNC: 1.09 UIU/ML
WBC # FLD AUTO: 8.07 K/UL

## 2022-08-18 LAB
CREAT SPEC-SCNC: 20 MG/DL
MICROALBUMIN 24H UR DL<=1MG/L-MCNC: <1.2 MG/DL
MICROALBUMIN/CREAT 24H UR-RTO: NORMAL MG/G

## 2022-08-22 ENCOUNTER — RESULT REVIEW (OUTPATIENT)
Age: 57
End: 2022-08-22

## 2022-08-22 ENCOUNTER — OUTPATIENT (OUTPATIENT)
Dept: OUTPATIENT SERVICES | Facility: HOSPITAL | Age: 57
LOS: 1 days | Discharge: HOME | End: 2022-08-22

## 2022-08-22 ENCOUNTER — APPOINTMENT (OUTPATIENT)
Dept: INTERNAL MEDICINE | Facility: CLINIC | Age: 57
End: 2022-08-22

## 2022-08-22 ENCOUNTER — NON-APPOINTMENT (OUTPATIENT)
Age: 57
End: 2022-08-22

## 2022-08-22 VITALS
OXYGEN SATURATION: 99 % | BODY MASS INDEX: 33.95 KG/M2 | HEART RATE: 93 BPM | SYSTOLIC BLOOD PRESSURE: 172 MMHG | HEIGHT: 68 IN | TEMPERATURE: 97.2 F | WEIGHT: 224 LBS | DIASTOLIC BLOOD PRESSURE: 96 MMHG

## 2022-08-22 DIAGNOSIS — Z98.890 OTHER SPECIFIED POSTPROCEDURAL STATES: Chronic | ICD-10-CM

## 2022-08-22 DIAGNOSIS — Z94.7 CORNEAL TRANSPLANT STATUS: Chronic | ICD-10-CM

## 2022-08-22 DIAGNOSIS — M54.9 DORSALGIA, UNSPECIFIED: ICD-10-CM

## 2022-08-22 PROCEDURE — 99214 OFFICE O/P EST MOD 30 MIN: CPT | Mod: GC

## 2022-08-22 RX ORDER — IRBESARTAN AND HYDROCHLOROTHIAZIDE 300; 12.5 MG/1; MG/1
300-12.5 TABLET ORAL DAILY
Qty: 30 | Refills: 3 | Status: DISCONTINUED | COMMUNITY
Start: 2019-03-27 | End: 2022-08-22

## 2022-08-22 NOTE — HISTORY OF PRESENT ILLNESS
[FreeTextEntry1] : fu [de-identified] : 57 M with HTN, DM DLD, legally blind due to bilateral glaucoma and chronic back pain, depression.\par Patient reports he feels better. his back pain is better with the help of a chiropracter. no chest pain, dyspnea, abd pain, or other symptoms. his bp is elevated. he is on 3 bp meds. Patient last time was having voice hoarsness, he saw ENT and was started on PPI, famotidine, and antihistamine. he is having dizziness. he is scheduled for colonoscopy next month

## 2022-08-22 NOTE — ASSESSMENT
[FreeTextEntry1] : 57 M with HTN, DM DLD, legally blind due to bilateral glaucoma and chronic back pain, depression.\par Patient reports he feels better. his back pain is better with the help of a chiropracter. no chest pain, dyspnea, abd pain, or other symptoms. his bp is elevated. he is on 3 bp meds. Patient last time was having voice hoarsness, he saw ENT and was started on PPI, famotidine, and antihistamine. he is having dizziness. he is scheduled for colonoscopy next month\par \par #Voice hoarseness - improved\par - due to acid reflux\par - ENT referral done --> patient started pn PPI, famotidine, and cetrizine\par - symptoms improved. dizziness from citrizine --> d/c it\par \par # back pain - musculoskeletal now improved \par - c/w methocarbamol prn\par - PT, OT \par \par #DMII - controlled\par - a1c improved from 7.5 to 6.6\par - c/w metformin 1 gram bid\par - podiatry following \par - A1C before next visit\par \par #bilateral glaucoma/ legally blind\par cont eye drops\par continue following opthalmology \par \par #HTN: elevated today 170/90 and at home\par -norvasc 10\par -irebesartan/ hctz 300/12.5 ( increase HCTZ to 25 mg)\par - labs reviewed including alb/cr ration and cr level all normal\par - do nader/renin\par - US doppler renal\par - Nephro eval for resistant HTN\par \par \par #DLD:\par - c/w Lipitor\par - lipid panel controlled\par \par #Polyps (tubular adenomas + irregular suspicious sigmoid polyp): \par - follow up with GI Dr. Porras this year colono in sept\par \par HCM:\par - colonoscopy in 2022.\par - rto in 3 months with blood work and prn. 
01-Nov-2021 14:36

## 2022-08-22 NOTE — PHYSICAL EXAM
[No Acute Distress] : no acute distress [No JVD] : no jugular venous distention [No Respiratory Distress] : no respiratory distress  [Normal Rate] : normal rate  [Regular Rhythm] : with a regular rhythm [Normal S1, S2] : normal S1 and S2 [Soft] : abdomen soft [Non Tender] : non-tender [No CVA Tenderness] : no CVA  tenderness [No Joint Swelling] : no joint swelling [No Rash] : no rash [Alert and Oriented x3] : oriented to person, place, and time

## 2022-08-23 ENCOUNTER — TRANSCRIPTION ENCOUNTER (OUTPATIENT)
Age: 57
End: 2022-08-23

## 2022-08-24 DIAGNOSIS — K21.9 GASTRO-ESOPHAGEAL REFLUX DISEASE WITHOUT ESOPHAGITIS: ICD-10-CM

## 2022-08-24 DIAGNOSIS — H54.8 LEGAL BLINDNESS, AS DEFINED IN USA: ICD-10-CM

## 2022-08-24 DIAGNOSIS — I10 ESSENTIAL (PRIMARY) HYPERTENSION: ICD-10-CM

## 2022-08-24 DIAGNOSIS — M54.9 DORSALGIA, UNSPECIFIED: ICD-10-CM

## 2022-08-24 DIAGNOSIS — E78.5 HYPERLIPIDEMIA, UNSPECIFIED: ICD-10-CM

## 2022-08-24 DIAGNOSIS — E11.9 TYPE 2 DIABETES MELLITUS WITHOUT COMPLICATIONS: ICD-10-CM

## 2022-09-13 ENCOUNTER — LABORATORY RESULT (OUTPATIENT)
Age: 57
End: 2022-09-13

## 2022-10-18 ENCOUNTER — LABORATORY RESULT (OUTPATIENT)
Age: 57
End: 2022-10-18

## 2022-10-21 ENCOUNTER — TRANSCRIPTION ENCOUNTER (OUTPATIENT)
Age: 57
End: 2022-10-21

## 2022-10-21 ENCOUNTER — OUTPATIENT (OUTPATIENT)
Dept: OUTPATIENT SERVICES | Facility: HOSPITAL | Age: 57
LOS: 1 days | Discharge: HOME | End: 2022-10-21

## 2022-10-21 ENCOUNTER — RESULT REVIEW (OUTPATIENT)
Age: 57
End: 2022-10-21

## 2022-10-21 VITALS
SYSTOLIC BLOOD PRESSURE: 179 MMHG | HEART RATE: 96 BPM | DIASTOLIC BLOOD PRESSURE: 88 MMHG | WEIGHT: 220.02 LBS | HEIGHT: 68 IN | TEMPERATURE: 99 F | RESPIRATION RATE: 18 BRPM

## 2022-10-21 VITALS
SYSTOLIC BLOOD PRESSURE: 123 MMHG | OXYGEN SATURATION: 96 % | DIASTOLIC BLOOD PRESSURE: 68 MMHG | RESPIRATION RATE: 18 BRPM | HEART RATE: 60 BPM

## 2022-10-21 DIAGNOSIS — Z94.7 CORNEAL TRANSPLANT STATUS: Chronic | ICD-10-CM

## 2022-10-21 DIAGNOSIS — Z98.890 OTHER SPECIFIED POSTPROCEDURAL STATES: Chronic | ICD-10-CM

## 2022-10-21 PROCEDURE — 45385 COLONOSCOPY W/LESION REMOVAL: CPT

## 2022-10-21 PROCEDURE — 88305 TISSUE EXAM BY PATHOLOGIST: CPT | Mod: 26

## 2022-10-21 RX ORDER — METHOCARBAMOL 500 MG/1
0 TABLET, FILM COATED ORAL
Qty: 0 | Refills: 0 | DISCHARGE

## 2022-10-21 RX ORDER — SODIUM CHLORIDE 9 MG/ML
1000 INJECTION, SOLUTION INTRAVENOUS
Refills: 0 | Status: DISCONTINUED | OUTPATIENT
Start: 2022-10-21 | End: 2022-11-04

## 2022-10-21 NOTE — ASU DISCHARGE PLAN (ADULT/PEDIATRIC) - CARE PROVIDER_API CALL
Elton Porras)  Gastroenterology; Internal Medicine  [address] [place] [address]  Phone: [phone]  Fax: [phone]  Follow Up Time:

## 2022-10-21 NOTE — ASU PATIENT PROFILE, ADULT - FALL HARM RISK - HARM RISK INTERVENTIONS

## 2022-10-21 NOTE — ASU PATIENT PROFILE, ADULT - ABILITY TO HEAR (WITH HEARING AID OR HEARING APPLIANCE IF NORMALLY USED):
----- Message from Jose Ballard [facility], [place] - [address]  [address]  [place] [address]  Phone: [phone] Fax: [phone]  
Adequate: hears normal conversation without difficulty

## 2022-10-21 NOTE — PRE-ANESTHESIA EVALUATION ADULT - NSANTHOSAYNRD_GEN_A_CORE
No. KD screening performed.  STOP BANG Legend: 0-2 = LOW Risk; 3-4 = INTERMEDIATE Risk; 5-8 = HIGH Risk

## 2022-10-21 NOTE — ASU DISCHARGE PLAN (ADULT/PEDIATRIC) - NS MD DC FALL RISK RISK
For information on Fall & Injury Prevention, visit: [email or web address] OR  [email or web address] OR  https://www.cdc.gov/steadi/patient.html

## 2022-10-21 NOTE — ASU PATIENT PROFILE, ADULT - VISION (WITH CORRECTIVE LENSES IF THE PATIENT USUALLY WEARS THEM):
blind/Severely impaired: cannot locate objects without hearing or touching them or patient nonresponsive.

## 2022-10-24 LAB — SURGICAL PATHOLOGY STUDY: SIGNIFICANT CHANGE UP

## 2022-10-26 DIAGNOSIS — K64.8 OTHER HEMORRHOIDS: ICD-10-CM

## 2022-10-26 DIAGNOSIS — Z12.11 ENCOUNTER FOR SCREENING FOR MALIGNANT NEOPLASM OF COLON: ICD-10-CM

## 2022-10-26 DIAGNOSIS — Z94.7 CORNEAL TRANSPLANT STATUS: ICD-10-CM

## 2022-10-26 DIAGNOSIS — Z79.82 LONG TERM (CURRENT) USE OF ASPIRIN: ICD-10-CM

## 2022-10-26 DIAGNOSIS — I10 ESSENTIAL (PRIMARY) HYPERTENSION: ICD-10-CM

## 2022-10-26 DIAGNOSIS — E11.9 TYPE 2 DIABETES MELLITUS WITHOUT COMPLICATIONS: ICD-10-CM

## 2022-10-26 DIAGNOSIS — K62.1 RECTAL POLYP: ICD-10-CM

## 2022-10-26 DIAGNOSIS — Z86.010 PERSONAL HISTORY OF COLONIC POLYPS: ICD-10-CM

## 2022-10-26 DIAGNOSIS — E78.00 PURE HYPERCHOLESTEROLEMIA, UNSPECIFIED: ICD-10-CM

## 2022-10-26 DIAGNOSIS — D12.3 BENIGN NEOPLASM OF TRANSVERSE COLON: ICD-10-CM

## 2022-10-26 DIAGNOSIS — Z79.84 LONG TERM (CURRENT) USE OF ORAL HYPOGLYCEMIC DRUGS: ICD-10-CM

## 2022-11-09 ENCOUNTER — APPOINTMENT (OUTPATIENT)
Dept: GASTROENTEROLOGY | Facility: CLINIC | Age: 57
End: 2022-11-09

## 2022-11-09 PROCEDURE — 99442: CPT

## 2022-11-09 RX ORDER — POLYETHYLENE GLYCOL-3350 AND ELECTROLYTES WITH FLAVOR PACK 240; 5.84; 2.98; 6.72; 22.72 G/278.26G; G/278.26G; G/278.26G; G/278.26G; G/278.26G
240 POWDER, FOR SOLUTION ORAL
Qty: 4000 | Refills: 0 | Status: DISCONTINUED | COMMUNITY
Start: 2022-07-20

## 2022-11-10 NOTE — ASSESSMENT
[FreeTextEntry1] : 57 yr old male with HTN, HLD, H/O colonic polyps with EMR for F/U post repeat Colonoscopy. He denied constipation, diarrhea, vomiting, abdominal pain, blood in stools, melena, dysphagia, heartburn, fevers, or weight loss. \par \par Results of Colonoscopy including pathology results were discussed with patient today; tubular adenomas without dysplasia.\par Repeat Colonoscopy in 3 years

## 2022-11-10 NOTE — HISTORY OF PRESENT ILLNESS
[Home] : at home, [unfilled] , at the time of the visit. [Medical Office: ([facility])___] : at the medical office located in  [Verbal consent obtained from patient] : the patient, [unfilled] [FreeTextEntry4] : Fátima [FreeTextEntry1] : 10/21/22

## 2022-11-22 ENCOUNTER — APPOINTMENT (OUTPATIENT)
Dept: NEPHROLOGY | Facility: CLINIC | Age: 57
End: 2022-11-22

## 2022-11-22 ENCOUNTER — OUTPATIENT (OUTPATIENT)
Dept: OUTPATIENT SERVICES | Facility: HOSPITAL | Age: 57
LOS: 1 days | Discharge: HOME | End: 2022-11-22

## 2022-11-22 VITALS
DIASTOLIC BLOOD PRESSURE: 83 MMHG | TEMPERATURE: 97.3 F | BODY MASS INDEX: 33.34 KG/M2 | HEART RATE: 62 BPM | SYSTOLIC BLOOD PRESSURE: 153 MMHG | HEIGHT: 68 IN | OXYGEN SATURATION: 97 % | WEIGHT: 220 LBS

## 2022-11-22 DIAGNOSIS — Z98.890 OTHER SPECIFIED POSTPROCEDURAL STATES: Chronic | ICD-10-CM

## 2022-11-22 DIAGNOSIS — Z94.7 CORNEAL TRANSPLANT STATUS: Chronic | ICD-10-CM

## 2022-11-22 LAB
ESTIMATED AVERAGE GLUCOSE: 128 MG/DL
HBA1C MFR BLD HPLC: 6.1 %

## 2022-11-22 PROCEDURE — 99203 OFFICE O/P NEW LOW 30 MIN: CPT | Mod: GC

## 2022-11-22 RX ORDER — AMLODIPINE BESYLATE 10 MG/1
10 TABLET ORAL
Qty: 30 | Refills: 3 | Status: DISCONTINUED | COMMUNITY
Start: 2018-05-29 | End: 2022-11-22

## 2022-11-22 NOTE — PHYSICAL EXAM
[General Appearance - Alert] : alert [General Appearance - In No Acute Distress] : in no acute distress [Outer Ear] : the ears and nose were normal in appearance [Oropharynx] : the oropharynx was normal [Neck Appearance] : the appearance of the neck was normal [Neck Cervical Mass (___cm)] : no neck mass was observed [Jugular Venous Distention Increased] : there was no jugular-venous distention [Thyroid Diffuse Enlargement] : the thyroid was not enlarged [Thyroid Nodule] : there were no palpable thyroid nodules [Auscultation Breath Sounds / Voice Sounds] : lungs were clear to auscultation bilaterally [Heart Rate And Rhythm] : heart rate was normal and rhythm regular [Heart Sounds] : normal S1 and S2 [Heart Sounds Gallop] : no gallops [Murmurs] : no murmurs [Heart Sounds Pericardial Friction Rub] : no pericardial rub [Bowel Sounds] : normal bowel sounds [Abdomen Soft] : soft [Abdomen Tenderness] : non-tender [] : no hepato-splenomegaly [Abdomen Mass (___ Cm)] : no abdominal mass palpated [Cervical Lymph Nodes Enlarged Posterior Bilaterally] : posterior cervical [Cervical Lymph Nodes Enlarged Anterior Bilaterally] : anterior cervical [Supraclavicular Lymph Nodes Enlarged Bilaterally] : supraclavicular [Axillary Lymph Nodes Enlarged Bilaterally] : axillary [Femoral Lymph Nodes Enlarged Bilaterally] : femoral [Inguinal Lymph Nodes Enlarged Bilaterally] : inguinal [Abnormal Walk] : normal gait [Nail Clubbing] : no clubbing  or cyanosis of the fingernails [Musculoskeletal - Swelling] : no joint swelling seen [Motor Tone] : muscle strength and tone were normal [FreeTextEntry1] : blind

## 2022-11-22 NOTE — HISTORY OF PRESENT ILLNESS
[FreeTextEntry1] : 57 M with HTN, DM, DLD, legally blind due to bilateral glaucoma and chronic back pain, depression presents for evaluation of treatment resistant hypertension. \par \par Today pressure is 153/83. Patient states that on his home BP reading it was 135/82. On last encounter with PCP patient's BP was 170/90.  Patient's regimen consists of norvasc 10 and irebesartan/ hctz 300/25mg.  labs reviewed including alb/cr ration and cr level all normal. Was sent for aldosterone and renin levels, US doppler, but patient has not completed yet. \par Patient was started on blood pressure medications ~45 years of age, he began exercising which improved the pressure slightly. The patient developed DM roughly 6 years ago and he feels he gains weight which is when the pressure seemed to get more out of control. \par Attest family history of HTN in paternal side of family. Father  of stroke at age 52. \par No evidence of CKD. \par No evidence of KD. \par \par Denies any headaches, numbness, tingling, abdominal pain, chest pain, sob, urinary symptoms. \par

## 2022-11-22 NOTE — ASSESSMENT
[FreeTextEntry1] : 57 M with HTN, DM, DLD, legally blind due to bilateral glaucoma and chronic back pain, depression presents for evaluation of treatment resistant hypertension. \par \par #Resistant hypertension \par - currently on amlodipine 10mg, irebesartan/ hctz 300/25mg\par - will DC amlodipne and start Procardia 30mg XL \par - begin blood pressure diary - AM and PM home BP reads\par - f/u renin/nader level\par - f/u US duplex kidneys \par - no evidence of CKD \par - no evidence of KD\par - f/u in 3 months and PRN \par

## 2022-11-22 NOTE — END OF VISIT
[] : Resident [FreeTextEntry3] : # HTN NOTED / will switch amlodipine to PROCARDIA XL\par continue irbesartan / HCTZ current \par check renin nader/ renal arteries dupplex / doubt resistant/ 2ary HTN \par RTC in 3 - 6 months

## 2022-11-23 LAB
ALDOSTERONE SERUM: 17.9 NG/DL
RENIN PLASMA: 35.3 PG/ML

## 2022-12-05 ENCOUNTER — APPOINTMENT (OUTPATIENT)
Dept: INTERNAL MEDICINE | Facility: CLINIC | Age: 57
End: 2022-12-05

## 2022-12-05 ENCOUNTER — OUTPATIENT (OUTPATIENT)
Dept: OUTPATIENT SERVICES | Facility: HOSPITAL | Age: 57
LOS: 1 days | Discharge: HOME | End: 2022-12-05

## 2022-12-05 VITALS
BODY MASS INDEX: 33.04 KG/M2 | DIASTOLIC BLOOD PRESSURE: 73 MMHG | HEIGHT: 68 IN | TEMPERATURE: 97.6 F | SYSTOLIC BLOOD PRESSURE: 158 MMHG | WEIGHT: 218 LBS | HEART RATE: 80 BPM | OXYGEN SATURATION: 98 %

## 2022-12-05 DIAGNOSIS — K63.5 POLYP OF COLON: ICD-10-CM

## 2022-12-05 DIAGNOSIS — Z94.7 CORNEAL TRANSPLANT STATUS: Chronic | ICD-10-CM

## 2022-12-05 DIAGNOSIS — Z98.890 OTHER SPECIFIED POSTPROCEDURAL STATES: Chronic | ICD-10-CM

## 2022-12-05 PROCEDURE — 99214 OFFICE O/P EST MOD 30 MIN: CPT | Mod: GC

## 2022-12-05 NOTE — PHYSICAL EXAM
[No Acute Distress] : no acute distress [No JVD] : no jugular venous distention [No Respiratory Distress] : no respiratory distress  [Normal Rate] : normal rate  [Regular Rhythm] : with a regular rhythm [Normal S1, S2] : normal S1 and S2 [Soft] : abdomen soft [Non Tender] : non-tender [No CVA Tenderness] : no CVA  tenderness [No Joint Swelling] : no joint swelling [No Rash] : no rash [Normal Affect] : the affect was normal [Alert and Oriented x3] : oriented to person, place, and time [Normal Insight/Judgement] : insight and judgment were intact [de-identified] : visually impaired, using cane

## 2022-12-05 NOTE — ASSESSMENT
[FreeTextEntry1] : 56 y/o male with HTN, DMII, DLD, legally blind due to bilateral glaucoma and chronic back pain, and depression presenting for routine follow up.\par \par #DMII- well controlled\par - A1c improved from 6.6 to 6.1 \par - c/w metformin 1 g bid\par - seen by podiatry\par \par #Resistant HTN\par - /83 today \par - Seen by nephro, started on Nifedipine ER 30 mg qd\par - c/w irebesartan/hctz 300/25 and nifedipine ER 30\par - f/u appt w/ nephro in Feburary 2023\par - f/u renal U/S\par - aldosterone WNL, renin elevated 35.3\par \par #DLD\par - c/w Lipitor\par - LDL 99 in 08/2022\par - pt swims 1-2x/week, encouraged increased exercise \par - counseled on low fat diet\par \par #Colonic polyps (tubular adenomas without dysplasia) \par - GI following, seen on 11/09/2022\par - GI recs for repeat colonoscopy in 3 years \par \par #GERD - improved\par #Voice hoarseness - improving\par - due to acid reflux\par - seen by ENT, started on PPI and famotidine\par - symptoms improved\par \par #Bilateral glaucoma/ legally blind\par - cont eye drops\par - continue following opthalmology \par \par #HCM\par - rpt colonoscopy in 3 years\par rto in 6months and prn

## 2022-12-05 NOTE — HISTORY OF PRESENT ILLNESS
[FreeTextEntry1] : routine follow up [de-identified] : 56 y/o male with HTN, DM2, DLD, legally blind due to bilateral glaucoma and chronic back pain, and depression, presenting today for routine follow. Reports that his mood is good, and that he doing well.\par \par Pt seen by nephro on 11/22/2022 for resistant HTN, started on Nifedipine ER 30 mg. Going for U/S in January before nephro f/u in February. \par \par Pt endorses intermittent dizziness upon standing lasting ~1-2 minute before resolving. Pt attributes sx to his visual impairment, and states that he sometimes feels unsteady when he stands, which resolved once he orients himself. Denies CP, palpitations, SOB, headaches, fainting spells, tinnitus. \par \par \par

## 2022-12-05 NOTE — REVIEW OF SYSTEMS
[Hoarseness] : hoarseness [Dizziness] : dizziness [Unsteady Walk] : ataxia [Fever] : no fever [Chills] : no chills [Fatigue] : no fatigue [Hearing Loss] : no hearing loss [Chest Pain] : no chest pain [Palpitations] : no palpitations [Lower Ext Edema] : no lower extremity edema [Shortness Of Breath] : no shortness of breath [Wheezing] : no wheezing [Cough] : no cough [Abdominal Pain] : no abdominal pain [Nausea] : no nausea [Constipation] : no constipation [Diarrhea] : no diarrhea [Vomiting] : no vomiting [Heartburn] : no heartburn [Dysuria] : no dysuria [Joint Pain] : no joint pain [Back Pain] : no back pain [Headache] : no headache [Depression] : no depression [de-identified] : intermittent unsteadiness and dizziness upon standing

## 2022-12-08 DIAGNOSIS — I10 ESSENTIAL (PRIMARY) HYPERTENSION: ICD-10-CM

## 2022-12-08 DIAGNOSIS — K63.5 POLYP OF COLON: ICD-10-CM

## 2022-12-08 DIAGNOSIS — E78.5 HYPERLIPIDEMIA, UNSPECIFIED: ICD-10-CM

## 2023-01-19 ENCOUNTER — OUTPATIENT (OUTPATIENT)
Dept: OUTPATIENT SERVICES | Facility: HOSPITAL | Age: 58
LOS: 1 days | Discharge: HOME | End: 2023-01-19
Payer: COMMERCIAL

## 2023-01-19 DIAGNOSIS — I10 ESSENTIAL (PRIMARY) HYPERTENSION: ICD-10-CM

## 2023-01-19 DIAGNOSIS — Z94.7 CORNEAL TRANSPLANT STATUS: Chronic | ICD-10-CM

## 2023-01-19 DIAGNOSIS — Z98.890 OTHER SPECIFIED POSTPROCEDURAL STATES: Chronic | ICD-10-CM

## 2023-01-19 PROCEDURE — 76775 US EXAM ABDO BACK WALL LIM: CPT | Mod: 26

## 2023-02-21 ENCOUNTER — OUTPATIENT (OUTPATIENT)
Dept: OUTPATIENT SERVICES | Facility: HOSPITAL | Age: 58
LOS: 1 days | End: 2023-02-21
Payer: COMMERCIAL

## 2023-02-21 ENCOUNTER — APPOINTMENT (OUTPATIENT)
Dept: NEPHROLOGY | Facility: CLINIC | Age: 58
End: 2023-02-21

## 2023-02-21 ENCOUNTER — APPOINTMENT (OUTPATIENT)
Dept: NEPHROLOGY | Facility: CLINIC | Age: 58
End: 2023-02-21
Payer: COMMERCIAL

## 2023-02-21 ENCOUNTER — RX RENEWAL (OUTPATIENT)
Age: 58
End: 2023-02-21

## 2023-02-21 VITALS
BODY MASS INDEX: 32.89 KG/M2 | WEIGHT: 217 LBS | SYSTOLIC BLOOD PRESSURE: 186 MMHG | DIASTOLIC BLOOD PRESSURE: 96 MMHG | TEMPERATURE: 98.2 F | HEIGHT: 68 IN | HEART RATE: 61 BPM | OXYGEN SATURATION: 100 %

## 2023-02-21 DIAGNOSIS — Z00.00 ENCOUNTER FOR GENERAL ADULT MEDICAL EXAMINATION WITHOUT ABNORMAL FINDINGS: ICD-10-CM

## 2023-02-21 DIAGNOSIS — Z94.7 CORNEAL TRANSPLANT STATUS: Chronic | ICD-10-CM

## 2023-02-21 DIAGNOSIS — Z98.890 OTHER SPECIFIED POSTPROCEDURAL STATES: Chronic | ICD-10-CM

## 2023-02-21 PROCEDURE — 99213 OFFICE O/P EST LOW 20 MIN: CPT | Mod: GC

## 2023-02-21 PROCEDURE — 99213 OFFICE O/P EST LOW 20 MIN: CPT

## 2023-02-21 NOTE — HISTORY OF PRESENT ILLNESS
[FreeTextEntry1] : 57 M with HTN, DM, DLD, legally blind due to bilateral glaucoma and chronic back pain, depression presents for evaluation of treatment resistant hypertension. Patient has been swimming 3x week. BP at home is around 140s/85s at the highest. Patient ran out of his procardia three days ago. \par BP today at first was 186/110 and repeat 150/96\par \par

## 2023-02-21 NOTE — END OF VISIT
[] : Resident [FreeTextEntry3] : Patient seen and  examined with renal resident \par Agree with not A and plan as in follow up \par # HTN on HCTZ / nifedeipine and irbesartan \par increase nifedipine dose \par no evidence of ALEXANDRO of primary hyperaldo\par RTC in 4 months

## 2023-02-21 NOTE — ASSESSMENT
[FreeTextEntry1] : 57 M with HTN, DM, DLD, legally blind due to bilateral glaucoma and chronic back pain, depression presents for evaluation of treatment resistant hypertension. \par \par #Resistant hypertension \par - currently on procardia 30mg, irebesartan/ hctz 300/25mg\par - increase procardia to 60mg q24h \par - cont blood pressure diary - AM and PM home BP reads\par - aldosterone 17.9, renin 35.3 not c/w hyperaldo \par - US duplex kidneys: right kidney simple cyst, no evidence of renal artery stenosis \par - f/u in 3 months and PRN

## 2023-02-21 NOTE — PHYSICAL EXAM
[General Appearance - Alert] : alert [General Appearance - In No Acute Distress] : in no acute distress [Sclera] : the sclera and conjunctiva were normal [Extraocular Movements] : extraocular movements were intact [Outer Ear] : the ears and nose were normal in appearance [Hearing Threshold Finger Rub Not [place]] : hearing was normal [Neck Appearance] : the appearance of the neck was normal [] : no respiratory distress [Respiration, Rhythm And Depth] : normal respiratory rhythm and effort [Auscultation Breath Sounds / Voice Sounds] : lungs were clear to auscultation bilaterally [Heart Rate And Rhythm] : heart rate was normal and rhythm regular [Heart Sounds] : normal S1 and S2 [Edema] : there was no peripheral edema [Veins - Varicosity Changes] : there were no varicosital changes [Bowel Sounds] : normal bowel sounds [Abdomen Soft] : soft [Abdomen Tenderness] : non-tender [Abnormal Walk] : normal gait [Musculoskeletal - Swelling] : no joint swelling seen [Skin Color & Pigmentation] : normal skin color and pigmentation [Skin Turgor] : normal skin turgor [Affect] : the affect was normal [Mood] : the mood was normal

## 2023-02-21 NOTE — REVIEW OF SYSTEMS
[Fever] : no fever [Chills] : no chills [Eye Pain] : no eye pain [Nasal Discharge] : no nasal discharge [Sore Throat] : no sore throat [Chest Pain] : no chest pain [Palpitations] : no palpitations [Shortness Of Breath] : no shortness of breath [Cough] : no cough [Constipation] : no constipation [Diarrhea] : no diarrhea [Skin Lesions] : no skin lesions [Skin Wound] : no skin wound [Dizziness] : no dizziness [Fainting] : no fainting

## 2023-02-22 DIAGNOSIS — I10 ESSENTIAL (PRIMARY) HYPERTENSION: ICD-10-CM

## 2023-03-03 ENCOUNTER — RX RENEWAL (OUTPATIENT)
Age: 58
End: 2023-03-03

## 2023-04-28 NOTE — ASU PREOP CHECKLIST - NSBLOODTRANS_GEN_A_CORE_SIUH
"Occupational Therapy Visit    Visit Type: Daily Treatment Note  Visit: 8  Referring Provider: Una Munoz DO  Medical Diagnosis (from order): Diagnosis Information    Diagnosis  V58.89, 842.13 (ICD-9-CM) - R79.711H (ICD-10-CM) - Sprain of interphalangeal joint of right ring finger, subsequent encounter       Patient alert and oriented X3. SUBJECTIVE                                                                                                               Patient was using a paint can this week and his finger splint broke. ""I haven't been working it too hard since then because I didn't want to make it worse without the splint. \""       Pain / Symptoms  - Pain rating (out of 10): Current: 4      OBJECTIVE                                                                                                                                       Treatment    Paraffin (99012)  - Location: right hand  - Position: sitting  - Temperature: 130Â° F  - Duration: 10 minutes    Results: decreased pain and improved range of motion  Reaction: no adverse reaction to treatment      Therapeutic Exercise  - Seated Digit Tendon Gliding  - 3 x daily - 7 x weekly - 10 reps  Velcro long handle with 2\"" hook Velcro to improve tight gripping with wrist flexion/extension, forearm pronation/supination x 20 reps  Level 2 green putty gripping, jar opener tool, levering tool, cone cut-outs  Rubber band openers x 20 reps   Red flex bar gripping and twisting  Wrist stretching-elbow extended/wrist/finger extension         Orthosis  Fabricated new relative motion orthosis for his right ring finger due to the previous one breaking   Skilled input: verbal instruction/cues    Writer verbally educated and received verbal consent for hand placement, positioning of patient, and techniques to be performed today from patient for modality application as described above and how they are pertinent to the patient's plan of care.     Home Exercise Program  Rubber " band openers  Level 3 putty gripping  - Seated Digit Tendon Gliding  - 3 x daily - 7 x weekly - 10 reps  - Finger AROM FDS tendon gliding  - 3 x daily - 7 x weekly - 10 reps        ASSESSMENT                                                                                                            Patient's custom splint for regaining finger flexion broke at home this week. A new one was fabricated today. He continues to tolerate resistive exercises without complaint of pain.    Education:   - Results of above outlined education: Verbalizes understanding and Demonstrates understanding    PLAN                                                                                                                           Suggestions for next session as indicated: Progress per plan of care, discharge       Therapy procedure time and total treatment time can be found documented on the Time Entry flowsheet no...

## 2023-05-31 ENCOUNTER — RX RENEWAL (OUTPATIENT)
Age: 58
End: 2023-05-31

## 2023-06-12 ENCOUNTER — OUTPATIENT (OUTPATIENT)
Dept: OUTPATIENT SERVICES | Facility: HOSPITAL | Age: 58
LOS: 1 days | End: 2023-06-12
Payer: COMMERCIAL

## 2023-06-12 ENCOUNTER — APPOINTMENT (OUTPATIENT)
Dept: INTERNAL MEDICINE | Facility: CLINIC | Age: 58
End: 2023-06-12
Payer: COMMERCIAL

## 2023-06-12 ENCOUNTER — APPOINTMENT (OUTPATIENT)
Dept: INTERNAL MEDICINE | Facility: CLINIC | Age: 58
End: 2023-06-12

## 2023-06-12 VITALS
WEIGHT: 214 LBS | SYSTOLIC BLOOD PRESSURE: 189 MMHG | HEIGHT: 68 IN | BODY MASS INDEX: 32.43 KG/M2 | TEMPERATURE: 98 F | OXYGEN SATURATION: 99 % | HEART RATE: 62 BPM | DIASTOLIC BLOOD PRESSURE: 82 MMHG

## 2023-06-12 VITALS — SYSTOLIC BLOOD PRESSURE: 167 MMHG | DIASTOLIC BLOOD PRESSURE: 84 MMHG

## 2023-06-12 DIAGNOSIS — H54.8 LEGAL BLINDNESS, AS DEFINED IN USA: ICD-10-CM

## 2023-06-12 DIAGNOSIS — Z94.7 CORNEAL TRANSPLANT STATUS: Chronic | ICD-10-CM

## 2023-06-12 DIAGNOSIS — Z98.890 OTHER SPECIFIED POSTPROCEDURAL STATES: Chronic | ICD-10-CM

## 2023-06-12 DIAGNOSIS — Z00.00 ENCOUNTER FOR GENERAL ADULT MEDICAL EXAMINATION WITHOUT ABNORMAL FINDINGS: ICD-10-CM

## 2023-06-12 DIAGNOSIS — E78.5 HYPERLIPIDEMIA, UNSPECIFIED: ICD-10-CM

## 2023-06-12 PROCEDURE — 99214 OFFICE O/P EST MOD 30 MIN: CPT | Mod: GC

## 2023-06-12 PROCEDURE — 99214 OFFICE O/P EST MOD 30 MIN: CPT

## 2023-06-12 NOTE — HISTORY OF PRESENT ILLNESS
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 Reports that his mood is good, and that he doing well.\par \par \par \par

## 2023-06-12 NOTE — PHYSICAL EXAM
[No Acute Distress] : no acute distress [Well Nourished] : well nourished [No JVD] : no jugular venous distention [No Respiratory Distress] : no respiratory distress  [No Accessory Muscle Use] : no accessory muscle use [Normal Rate] : normal rate  [Regular Rhythm] : with a regular rhythm [Soft] : abdomen soft [No CVA Tenderness] : no CVA  tenderness [No Rash] : no rash [No Focal Deficits] : no focal deficits [de-identified] : Blind

## 2023-06-12 NOTE — ASSESSMENT
[FreeTextEntry1] : #DMII- well controlled\par - Last Hb A1c 2022: 6.1% within goal, repeat\par - c/w metformin 1g bid\par - obtain urine Creatinine: Albumin ratio\par - Podiatry referral\par - ASCVD score: 23.95% --> High intensity statin\par \par #Resistant HTN\par - BP today: 189/82 mmHg, repeat 167/84mmHg. \par - Reports Compliance\par - Last seen nephrology 2023\par  - c/w irebesartan/hctz 300/25 and increase nifedipine ER to 60mg po once daily as per last nephrology recommendations. May need a 4th class of anti-hypertensive medications.\par - aldosterone WNL, renin elevated 35.3 --> Not consistent with Hyperaldosteronism\par - Renal Sonogram 2022: \par a. Normal renal ultrasounds\par b. No evidence of ALEXANDRO\par \par \par #DLD\par - c/w Lipitor, increase to 40mg po once daily\par - Last lipid profile 2022: T, Total Chol: 162, HDL: 36,  LDL 99 \par - ASCVD score: 23.95%, Increase Atorvastatin to 40mg po once daily\par - pt swims 1-2x/week, encouraged increased exercise \par - counseled on low fat diet\par \par #Colonic polyps (tubular adenomas without dysplasia) \par - Last Colono 2022\par - 1 sigmoid colon polyp, 4mm s/p polypectomy \par - 2 Hepetic flexure polyps ( 5mm and 8 mm), s/p polypectomy and Endoclip\par - 1 Transverse colon 5mm s/p polypectomy\par - 1 in Rectum, 5mm, s/p polypectomy\par - ALL POLYPS were of Tubular Adenoma histologically except for rectal: Hyperplastic\par - Follow up with GI, will likely need a repeat colonoscopy in 1-3 years\par \par \par #GERD - improved\par #Voice hoarseness - improving\par - due to acid reflux\par - seen by ENT, started on PPI and famotidine\par - symptoms improved\par \par #Bilateral glaucoma/ legally blind\par - cont eye drops\par - continue following opthalmology \par - The patient lives alone, independent, he lives right [place] the [employer] where he works, he reports that he is very good social support and help from the [employer] community and friends, declines the need for social work at the moment\par \par #HCM\par - rpt colonoscopy in 3 years\par rto in 6months and prn. \par - No indication for lung cancer screening at the moment\par

## 2023-06-14 DIAGNOSIS — E78.5 HYPERLIPIDEMIA, UNSPECIFIED: ICD-10-CM

## 2023-06-14 DIAGNOSIS — H54.8 LEGAL BLINDNESS, AS DEFINED IN USA: ICD-10-CM

## 2023-06-14 DIAGNOSIS — E11.9 TYPE 2 DIABETES MELLITUS WITHOUT COMPLICATIONS: ICD-10-CM

## 2023-06-14 DIAGNOSIS — I10 ESSENTIAL (PRIMARY) HYPERTENSION: ICD-10-CM

## 2023-06-14 DIAGNOSIS — Z00.00 ENCOUNTER FOR GENERAL ADULT MEDICAL EXAMINATION WITHOUT ABNORMAL FINDINGS: ICD-10-CM

## 2023-06-14 DIAGNOSIS — K63.5 POLYP OF COLON: ICD-10-CM

## 2023-06-20 ENCOUNTER — APPOINTMENT (OUTPATIENT)
Dept: NEPHROLOGY | Facility: CLINIC | Age: 58
End: 2023-06-20
Payer: COMMERCIAL

## 2023-06-20 ENCOUNTER — APPOINTMENT (OUTPATIENT)
Dept: PODIATRY | Facility: CLINIC | Age: 58
End: 2023-06-20
Payer: COMMERCIAL

## 2023-06-20 ENCOUNTER — OUTPATIENT (OUTPATIENT)
Dept: OUTPATIENT SERVICES | Facility: HOSPITAL | Age: 58
LOS: 1 days | End: 2023-06-20
Payer: COMMERCIAL

## 2023-06-20 VITALS
TEMPERATURE: 97.5 F | WEIGHT: 211 LBS | DIASTOLIC BLOOD PRESSURE: 89 MMHG | BODY MASS INDEX: 31.98 KG/M2 | SYSTOLIC BLOOD PRESSURE: 171 MMHG | HEART RATE: 64 BPM | HEIGHT: 68 IN | OXYGEN SATURATION: 99 %

## 2023-06-20 DIAGNOSIS — Z94.7 CORNEAL TRANSPLANT STATUS: Chronic | ICD-10-CM

## 2023-06-20 DIAGNOSIS — Z98.890 OTHER SPECIFIED POSTPROCEDURAL STATES: Chronic | ICD-10-CM

## 2023-06-20 DIAGNOSIS — Z00.00 ENCOUNTER FOR GENERAL ADULT MEDICAL EXAMINATION WITHOUT ABNORMAL FINDINGS: ICD-10-CM

## 2023-06-20 PROCEDURE — 99213 OFFICE O/P EST LOW 20 MIN: CPT

## 2023-06-20 PROCEDURE — 99213 OFFICE O/P EST LOW 20 MIN: CPT | Mod: GC

## 2023-06-20 NOTE — REVIEW OF SYSTEMS
[Fever] : no fever [Chest Pain] : no chest pain [Lower Ext Edema] : no extremity edema [Shortness Of Breath] : no shortness of breath [SOB on Exertion] : no shortness of breath during exertion [Orthopnea] : no orthopnea

## 2023-06-20 NOTE — PHYSICAL EXAM
[General Appearance - In No Acute Distress] : in no acute distress [] : no respiratory distress [Exaggerated Use Of Accessory Muscles For Inspiration] : no accessory muscle use [Auscultation Breath Sounds / Voice Sounds] : lungs were clear to auscultation bilaterally [Heart Rate And Rhythm] : heart rate was normal and rhythm regular [Heart Sounds] : normal S1 and S2 [Edema] : there was no peripheral edema

## 2023-06-20 NOTE — HISTORY OF PRESENT ILLNESS
[FreeTextEntry1] : 58 y/o M resistant HTN, DM, DLD, legally blind 2/2 glaucoma presenting to clinic for follow up.\par Pt states that he is in a good state of health, swims 3 times/week. His home BP reading are in the 140s/70s.\par He denies any CP, SOB, LE edema. \par No other complaints

## 2023-06-20 NOTE — ASSESSMENT
[FreeTextEntry1] : 58 y/o M resistant HTN, DM, DLD, legally blind 2/2 glaucoma presenting to clinic for follow up.\par \par # Resistant hypertension \par - /89 today, at home 140s/70s and at night SBP 120s\par - currently on procardia 60mg, irebesartan/ hctz 300/25mg, will continue same doses\par - cont blood pressure diary - AM and PM home BP reads\par - aldosterone 17.9, renin 35.3 not c/w hyperaldo with PAR< 15 \par - US duplex kidneys: right kidney simple cyst, no evidence of renal artery stenosis \par - check CMP and urine pr/cr ratio before next visit\par - f/u in 6 months and PRN. \par

## 2023-06-20 NOTE — END OF VISIT
[] : Resident [FreeTextEntry3] : Patient seen and  examined with renal resident \par Agree with note A AND PLAN\par # htn controlled at home \par continue current \par can increase nifedipine dose if  needed / no evidence of secondary HTN \par RTC in 6 months

## 2023-06-21 NOTE — PHYSICAL EXAM
[General Appearance - Alert] : alert [General Appearance - In No Acute Distress] : in no acute distress [2+] : left foot dorsalis pedis 2+ [Oriented To Time, Place, And Person] : oriented to person, place, and time [Impaired Insight] : insight and judgment were intact [Foot Ulcer] : no foot ulcer [Skin Induration] : no skin induration [Vibration Dec.] : normal vibratory sensation at the level of the toes [Diminished Throughout Right Foot] : normal sensation with monofilament testing throughout right foot [Diminished Throughout Left Foot] : normal sensation with monofilament testing throughout left foot

## 2023-06-21 NOTE — HISTORY OF PRESENT ILLNESS
[FreeTextEntry1] : 57 year old M  presents for a diabetic foot evaluation. Mr. NIEVES denies any tingling burning in his feet. Last A1c was 6.1% 11/2022\par

## 2023-06-21 NOTE — ASSESSMENT
[FreeTextEntry1] : Modified ADA Diabetic Foot Risk Classification 0\par A1c reviewed \par -Loss of protective sensation: no\par -Presence of foot deformity:   no \par -presence of pre-ulcerative lesion: no\par   -hemorrhage into callus:  no\par -atrophy of heel or metatarsal fat pads:  no\par \par -Diabetic neurovascular foot assessment  performed. \par -Discussed with patient diabetic foot hygiene.Patient instructed to regularly check the bottom of the feet\par -Return one year\par \par

## 2023-06-27 DIAGNOSIS — E11.9 TYPE 2 DIABETES MELLITUS WITHOUT COMPLICATIONS: ICD-10-CM

## 2023-06-28 DIAGNOSIS — I10 ESSENTIAL (PRIMARY) HYPERTENSION: ICD-10-CM

## 2023-09-19 ENCOUNTER — RX RENEWAL (OUTPATIENT)
Age: 58
End: 2023-09-19

## 2023-09-25 ENCOUNTER — APPOINTMENT (OUTPATIENT)
Dept: PODIATRY | Facility: CLINIC | Age: 58
End: 2023-09-25
Payer: COMMERCIAL

## 2023-09-25 ENCOUNTER — OUTPATIENT (OUTPATIENT)
Dept: OUTPATIENT SERVICES | Facility: HOSPITAL | Age: 58
LOS: 1 days | End: 2023-09-25
Payer: COMMERCIAL

## 2023-09-25 ENCOUNTER — NON-APPOINTMENT (OUTPATIENT)
Age: 58
End: 2023-09-25

## 2023-09-25 DIAGNOSIS — Z98.890 OTHER SPECIFIED POSTPROCEDURAL STATES: Chronic | ICD-10-CM

## 2023-09-25 DIAGNOSIS — Z00.00 ENCOUNTER FOR GENERAL ADULT MEDICAL EXAMINATION WITHOUT ABNORMAL FINDINGS: ICD-10-CM

## 2023-09-25 DIAGNOSIS — Z94.7 CORNEAL TRANSPLANT STATUS: Chronic | ICD-10-CM

## 2023-09-25 PROCEDURE — 11721 DEBRIDE NAIL 6 OR MORE: CPT
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
## 2023-09-28 DIAGNOSIS — R26.2 DIFFICULTY IN WALKING, NOT ELSEWHERE CLASSIFIED: ICD-10-CM

## 2023-09-28 DIAGNOSIS — L60.0 INGROWING NAIL: ICD-10-CM
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
## 2023-11-01 ENCOUNTER — APPOINTMENT (OUTPATIENT)
Dept: GASTROENTEROLOGY | Facility: CLINIC | Age: 58
End: 2023-11-01
Payer: COMMERCIAL

## 2023-11-01 VITALS — HEIGHT: 68 IN | WEIGHT: 211 LBS | BODY MASS INDEX: 31.98 KG/M2

## 2023-11-01 DIAGNOSIS — Z78.9 OTHER SPECIFIED HEALTH STATUS: ICD-10-CM

## 2023-11-01 PROCEDURE — 99214 OFFICE O/P EST MOD 30 MIN: CPT

## 2023-11-01 RX ORDER — METHOCARBAMOL 500 MG/1
500 TABLET, FILM COATED ORAL
Qty: 60 | Refills: 3 | Status: DISCONTINUED | COMMUNITY
Start: 2017-12-08 | End: 2023-11-01

## 2023-11-01 RX ORDER — POLYETHYLENE GLYCOL 3350 AND ELECTROLYTES WITH LEMON FLAVOR 236; 22.74; 6.74; 5.86; 2.97 G/4L; G/4L; G/4L; G/4L; G/4L
236 POWDER, FOR SOLUTION ORAL
Qty: 1 | Refills: 0 | Status: DISCONTINUED | COMMUNITY
Start: 2022-07-20 | End: 2023-11-01

## 2023-11-01 RX ORDER — MICONAZOLE NITRATE 20 MG/G
2 CREAM TOPICAL TWICE DAILY
Qty: 2 | Refills: 0 | Status: DISCONTINUED | COMMUNITY
Start: 2020-02-10 | End: 2023-11-01

## 2023-11-01 RX ORDER — AMMONIUM LACTATE 12 %
12 CREAM (GRAM) TOPICAL TWICE DAILY
Qty: 1 | Refills: 5 | Status: DISCONTINUED | COMMUNITY
Start: 2020-02-26 | End: 2023-11-01

## 2023-11-01 RX ORDER — HYDROCHLOROTHIAZIDE 25 MG/1
25 TABLET ORAL
Qty: 30 | Refills: 3 | Status: DISCONTINUED | COMMUNITY
Start: 2022-08-22 | End: 2023-11-01

## 2023-11-01 RX ORDER — ATORVASTATIN CALCIUM 40 MG/1
40 TABLET, FILM COATED ORAL
Qty: 30 | Refills: 5 | Status: DISCONTINUED | COMMUNITY
Start: 2017-05-02 | End: 2023-11-01

## 2023-11-01 RX ORDER — FAMOTIDINE 40 MG/1
40 TABLET, FILM COATED ORAL
Qty: 1 | Refills: 6 | Status: ACTIVE | COMMUNITY
Start: 2023-11-01 | End: 1900-01-01

## 2023-11-01 RX ORDER — TRIAMCINOLONE ACETONIDE 1 MG/G
0.1 CREAM TOPICAL
Qty: 80 | Refills: 5 | Status: DISCONTINUED | COMMUNITY
Start: 2020-03-03 | End: 2023-11-01

## 2023-11-01 RX ORDER — NIFEDIPINE 60 MG/1
60 TABLET, EXTENDED RELEASE ORAL DAILY
Qty: 30 | Refills: 5 | Status: DISCONTINUED | COMMUNITY
Start: 2022-11-22 | End: 2023-11-01

## 2023-11-07 ENCOUNTER — APPOINTMENT (OUTPATIENT)
Dept: OTOLARYNGOLOGY | Facility: CLINIC | Age: 58
End: 2023-11-07
Payer: COMMERCIAL

## 2023-11-07 VITALS — WEIGHT: 211 LBS | BODY MASS INDEX: 31.98 KG/M2 | HEIGHT: 68 IN

## 2023-11-07 PROCEDURE — 99214 OFFICE O/P EST MOD 30 MIN: CPT | Mod: 25

## 2023-11-07 PROCEDURE — 31575 DIAGNOSTIC LARYNGOSCOPY: CPT

## 2023-11-27 ENCOUNTER — APPOINTMENT (OUTPATIENT)
Dept: OTOLARYNGOLOGY | Facility: CLINIC | Age: 58
End: 2023-11-27
Payer: COMMERCIAL

## 2023-11-27 PROCEDURE — 92524 BEHAVRAL QUALIT ANALYS VOICE: CPT | Mod: GN,59

## 2023-11-27 PROCEDURE — 92507 TX SP LANG VOICE COMM INDIV: CPT | Mod: GN,59

## 2023-12-04 ENCOUNTER — APPOINTMENT (OUTPATIENT)
Dept: OTOLARYNGOLOGY | Facility: CLINIC | Age: 58
End: 2023-12-04
Payer: COMMERCIAL

## 2023-12-04 ENCOUNTER — OUTPATIENT (OUTPATIENT)
Dept: OUTPATIENT SERVICES | Facility: HOSPITAL | Age: 58
LOS: 1 days | End: 2023-12-04
Payer: COMMERCIAL

## 2023-12-04 DIAGNOSIS — Z00.00 ENCOUNTER FOR GENERAL ADULT MEDICAL EXAMINATION WITHOUT ABNORMAL FINDINGS: ICD-10-CM

## 2023-12-04 DIAGNOSIS — Z94.7 CORNEAL TRANSPLANT STATUS: Chronic | ICD-10-CM

## 2023-12-04 DIAGNOSIS — Z98.890 OTHER SPECIFIED POSTPROCEDURAL STATES: Chronic | ICD-10-CM

## 2023-12-04 LAB
ALBUMIN SERPL ELPH-MCNC: 4.4 G/DL
ALP BLD-CCNC: 73 U/L
ALT SERPL-CCNC: 26 U/L
ANION GAP SERPL CALC-SCNC: 12 MMOL/L
AST SERPL-CCNC: 14 U/L
BILIRUB SERPL-MCNC: 0.3 MG/DL
BUN SERPL-MCNC: 16 MG/DL
CALCIUM SERPL-MCNC: 9.7 MG/DL
CHLORIDE SERPL-SCNC: 101 MMOL/L
CHOLEST SERPL-MCNC: 117 MG/DL
CO2 SERPL-SCNC: 27 MMOL/L
CREAT SERPL-MCNC: 0.9 MG/DL
EGFR: 99 ML/MIN/1.73M2
ESTIMATED AVERAGE GLUCOSE: 131 MG/DL
GLUCOSE SERPL-MCNC: 108 MG/DL
HBA1C MFR BLD HPLC: 6.2 %
HDLC SERPL-MCNC: 36 MG/DL
LDLC SERPL CALC-MCNC: 68 MG/DL
NONHDLC SERPL-MCNC: 81 MG/DL
POTASSIUM SERPL-SCNC: 4.2 MMOL/L
PROT SERPL-MCNC: 6.7 G/DL
SODIUM SERPL-SCNC: 140 MMOL/L
TRIGL SERPL-MCNC: 67 MG/DL
TSH SERPL-ACNC: 1.93 UIU/ML

## 2023-12-04 PROCEDURE — 85027 COMPLETE CBC AUTOMATED: CPT

## 2023-12-04 PROCEDURE — 82043 UR ALBUMIN QUANTITATIVE: CPT

## 2023-12-04 PROCEDURE — 84443 ASSAY THYROID STIM HORMONE: CPT

## 2023-12-04 PROCEDURE — 80053 COMPREHEN METABOLIC PANEL: CPT

## 2023-12-04 PROCEDURE — 92507 TX SP LANG VOICE COMM INDIV: CPT | Mod: GN

## 2023-12-04 PROCEDURE — 80061 LIPID PANEL: CPT

## 2023-12-04 PROCEDURE — 83036 HEMOGLOBIN GLYCOSYLATED A1C: CPT

## 2023-12-05 DIAGNOSIS — Z00.00 ENCOUNTER FOR GENERAL ADULT MEDICAL EXAMINATION WITHOUT ABNORMAL FINDINGS: ICD-10-CM

## 2023-12-05 LAB
CREAT SPEC-SCNC: 179 MG/DL
MICROALBUMIN 24H UR DL<=1MG/L-MCNC: <1.2 MG/DL
MICROALBUMIN/CREAT 24H UR-RTO: NORMAL MG/G

## 2023-12-14 ENCOUNTER — RX RENEWAL (OUTPATIENT)
Age: 58
End: 2023-12-14

## 2023-12-14 ENCOUNTER — APPOINTMENT (OUTPATIENT)
Dept: OTOLARYNGOLOGY | Facility: CLINIC | Age: 58
End: 2023-12-14
Payer: COMMERCIAL

## 2023-12-14 PROCEDURE — 31579 LARYNGOSCOPY TELESCOPIC: CPT | Mod: GN,59

## 2023-12-14 PROCEDURE — 92507 TX SP LANG VOICE COMM INDIV: CPT | Mod: GN

## 2023-12-19 ENCOUNTER — APPOINTMENT (OUTPATIENT)
Dept: NEPHROLOGY | Facility: CLINIC | Age: 58
End: 2023-12-19
Payer: COMMERCIAL

## 2023-12-19 ENCOUNTER — OUTPATIENT (OUTPATIENT)
Dept: OUTPATIENT SERVICES | Facility: HOSPITAL | Age: 58
LOS: 1 days | End: 2023-12-19
Payer: COMMERCIAL

## 2023-12-19 VITALS
DIASTOLIC BLOOD PRESSURE: 124 MMHG | BODY MASS INDEX: 31.83 KG/M2 | WEIGHT: 210 LBS | SYSTOLIC BLOOD PRESSURE: 199 MMHG | HEIGHT: 68 IN | TEMPERATURE: 97.3 F | HEART RATE: 67 BPM | OXYGEN SATURATION: 98 %

## 2023-12-19 VITALS — SYSTOLIC BLOOD PRESSURE: 180 MMHG | DIASTOLIC BLOOD PRESSURE: 90 MMHG

## 2023-12-19 VITALS — SYSTOLIC BLOOD PRESSURE: 189 MMHG | DIASTOLIC BLOOD PRESSURE: 111 MMHG

## 2023-12-19 DIAGNOSIS — Z00.00 ENCOUNTER FOR GENERAL ADULT MEDICAL EXAMINATION WITHOUT ABNORMAL FINDINGS: ICD-10-CM

## 2023-12-19 DIAGNOSIS — Z98.890 OTHER SPECIFIED POSTPROCEDURAL STATES: Chronic | ICD-10-CM

## 2023-12-19 DIAGNOSIS — Z94.7 CORNEAL TRANSPLANT STATUS: Chronic | ICD-10-CM

## 2023-12-19 PROCEDURE — 99215 OFFICE O/P EST HI 40 MIN: CPT

## 2023-12-19 RX ORDER — NIFEDIPINE 60 MG/1
60 TABLET, EXTENDED RELEASE ORAL
Refills: 0 | Status: DISCONTINUED | COMMUNITY
End: 2023-12-19

## 2023-12-19 NOTE — ASSESSMENT
[FreeTextEntry1] : 57 y/o M resistant HTN, DM, DLD, legally blind 2/2 glaucoma presenting to clinic for follow up.  # Resistant hypertension - /124 today s/p 0.1 mg Clonidine STAT in office - currently on procardia 60mg, irebesartan/ hctz 300/25mg, will increase Nifedipine to 90 mg - cont blood pressure monitoring - aldosterone 17.9, renin 35.3 not c/w hyperaldo with PAR< 15 - US duplex kidneys: right kidney simple cyst, no evidence of renal artery stenosis (jan 2023) - Cr 0.9 eGFR 99 (Dec 2023) - Albumin urine<1.2

## 2023-12-19 NOTE — HISTORY OF PRESENT ILLNESS
[FreeTextEntry1] : 59 y/o M resistant HTN, DM, DLD, legally blind 2/2 glaucoma presenting to clinic BP here 190-for follow up. Patient reports that he ran out of his Nifedipine medication 3 days ago. BP here today:  200-190/.  At baseline, patient reports he takes his BP at home in the range of 160's.  Patient asymptomatic, no complaints.

## 2023-12-19 NOTE — END OF VISIT
[] : Resident [FreeTextEntry3] : BP uncontrolled in clinic this morning, though patient notes he has run out of his Nifedipine.  Clonidine 0.1mg given in clinic this morning with improvement in BP to 180/80 after use.  Has been asymptomatic during clinic evaluation.  We will refill his Irbesartan 300mg daily and HCTZ 25mg daily now, and increase his Procardia XL to 90mg daily.  Advised patient to perform meticulous BP record keeping at home, and bring results to next appointment.  If BP remains uncontrolled, we will likely need to start him on Aldactone.  RTC 3-4 months. [Time Spent: ___ minutes] : I have spent [unfilled] minutes of time on the encounter.

## 2023-12-20 DIAGNOSIS — Q61.9 CYSTIC KIDNEY DISEASE, UNSPECIFIED: ICD-10-CM

## 2023-12-20 DIAGNOSIS — I16.0 HYPERTENSIVE URGENCY: ICD-10-CM

## 2023-12-20 DIAGNOSIS — R60.9 EDEMA, UNSPECIFIED: ICD-10-CM

## 2023-12-20 DIAGNOSIS — E78.5 HYPERLIPIDEMIA, UNSPECIFIED: ICD-10-CM

## 2023-12-20 DIAGNOSIS — I10 ESSENTIAL (PRIMARY) HYPERTENSION: ICD-10-CM

## 2023-12-22 ENCOUNTER — APPOINTMENT (OUTPATIENT)
Dept: OTOLARYNGOLOGY | Facility: CLINIC | Age: 58
End: 2023-12-22

## 2023-12-26 ENCOUNTER — APPOINTMENT (OUTPATIENT)
Dept: PODIATRY | Facility: CLINIC | Age: 58
End: 2023-12-26

## 2024-01-01 NOTE — PLAN
Mom states Beatrice was in for a cold, but for the past 3 nights mom is sure she has a fever in the middle of the night. Child seems very hot, but mom has not taken temp and has just administered tylenol. Extremely congested, mom does not like the sound of child's breathing at night.   [FreeTextEntry1] : 55M with HTN, DM II, DLD, legally blind due to bilateral glaucoma and chronic back pain, depression.\par Presenting for follow up and back pain\par \par # back pain - musculoskeletal now improved  \par - c/w PT \par -c/w muscles relaxant \par \par #. Acrochordon neck \par -was treated with liquid nitrogen in 2017\par - lesion to left upper chest persists\par -following with deramtology \par \par #DMII: controlled \par - Continue with metformin\par - podiatry following \par \par \par #bilateral glaucoma/ legally blind\par cont eye drops\par continue  following  opthalmology \par \par #HTN: elevated today, white coat hypertension, controlled ay home\par -norvasc 10\par -irebesartan/ hctz 300/12.5\par - machine noted \par \par #DLD:\par - c/w Lipitor\par - repeat lipid panel \par \par \par #Polyps (tubular adenomas + irregular suspicious sigmoid polyp): \par - follow up with GI in 2021\par \par HCM:\par - colonoscopy in 2021. \par - refuses  flu vacc\par rto in 6 months and prn

## 2024-01-04 ENCOUNTER — APPOINTMENT (OUTPATIENT)
Dept: OTOLARYNGOLOGY | Facility: CLINIC | Age: 59
End: 2024-01-04

## 2024-01-08 ENCOUNTER — APPOINTMENT (OUTPATIENT)
Dept: OTOLARYNGOLOGY | Facility: CLINIC | Age: 59
End: 2024-01-08

## 2024-01-17 ENCOUNTER — APPOINTMENT (OUTPATIENT)
Dept: INTERNAL MEDICINE | Facility: CLINIC | Age: 59
End: 2024-01-17
Payer: COMMERCIAL

## 2024-01-17 ENCOUNTER — OUTPATIENT (OUTPATIENT)
Dept: OUTPATIENT SERVICES | Facility: HOSPITAL | Age: 59
LOS: 1 days | End: 2024-01-17
Payer: COMMERCIAL

## 2024-01-17 VITALS
HEART RATE: 98 BPM | OXYGEN SATURATION: 99 % | SYSTOLIC BLOOD PRESSURE: 158 MMHG | HEIGHT: 68 IN | DIASTOLIC BLOOD PRESSURE: 79 MMHG | BODY MASS INDEX: 31.52 KG/M2 | WEIGHT: 208 LBS | TEMPERATURE: 97.3 F

## 2024-01-17 DIAGNOSIS — Z00.00 ENCOUNTER FOR GENERAL ADULT MEDICAL EXAMINATION WITHOUT ABNORMAL FINDINGS: ICD-10-CM

## 2024-01-17 DIAGNOSIS — E11.9 TYPE 2 DIABETES MELLITUS W/OUT COMPLICATIONS: ICD-10-CM

## 2024-01-17 DIAGNOSIS — Z00.00 ENCOUNTER FOR GENERAL ADULT MEDICAL EXAMINATION W/OUT ABNORMAL FINDINGS: ICD-10-CM

## 2024-01-17 PROCEDURE — 99214 OFFICE O/P EST MOD 30 MIN: CPT

## 2024-01-17 RX ORDER — PANTOPRAZOLE 40 MG/1
40 TABLET, DELAYED RELEASE ORAL
Qty: 30 | Refills: 5 | Status: DISCONTINUED | COMMUNITY
Start: 2022-03-29 | End: 2024-01-17

## 2024-01-17 RX ORDER — METFORMIN HYDROCHLORIDE 1000 MG/1
1000 TABLET, COATED ORAL
Qty: 60 | Refills: 5 | Status: ACTIVE | COMMUNITY
Start: 2017-05-02 | End: 1900-01-01

## 2024-01-17 RX ORDER — IRBESARTAN 300 MG/1
300 TABLET ORAL
Qty: 30 | Refills: 5 | Status: ACTIVE | COMMUNITY
Start: 2022-08-22 | End: 1900-01-01

## 2024-01-17 RX ORDER — FAMOTIDINE 40 MG/1
40 TABLET, FILM COATED ORAL
Qty: 30 | Refills: 5 | Status: DISCONTINUED | COMMUNITY
Start: 2022-03-29 | End: 2024-01-17

## 2024-01-17 RX ORDER — ATORVASTATIN CALCIUM 80 MG/1
TABLET, FILM COATED ORAL
Refills: 0 | Status: DISCONTINUED | COMMUNITY
End: 2024-01-17

## 2024-01-17 RX ORDER — ASPIRIN ENTERIC COATED TABLETS 81 MG 81 MG/1
81 TABLET, DELAYED RELEASE ORAL DAILY
Qty: 30 | Refills: 5 | Status: ACTIVE | COMMUNITY
Start: 2018-09-17 | End: 1900-01-01

## 2024-01-17 RX ORDER — ATORVASTATIN CALCIUM 40 MG/1
40 TABLET, FILM COATED ORAL
Qty: 30 | Refills: 6 | Status: ACTIVE | COMMUNITY
Start: 2024-01-17 | End: 1900-01-01

## 2024-01-17 NOTE — HISTORY OF PRESENT ILLNESS
[FreeTextEntry1] : Follow up [de-identified] : Case of 58-year-old, M, with a PMHX: HTN, DM2, DLD, legally blind due to bilateral glaucoma and chronic back pain, and ?depression, presenting today for follow up.  Reports that his mood is good, and that he doing well.

## 2024-01-17 NOTE — ASSESSMENT
[FreeTextEntry1] : Case of 58-year-old male, with PMHx of HTN, DM2, DLD, legally blind due to bilateral glaucoma and chronic back pain, and depression, presenting today for follow up.  #DMII- controlled - Last Hb A1c December 2023: 6.2% - c/w metformin 1g bid - c/w statin - on aspirin as primary prevention, would consider stopping if BP remains uncontrolled - Following with podiatry - Follow up with ophtho yearly   #Resistant HTN - BP today: 158/79 (better than before) - Previous workup done for resistant HTN - Renal Sonogram August 2022: a. Normal renal ultrasounds b. No evidence of ALEXANDRO - Following with nephrology, last seen in December - c/w Irbesartan 300mg daily - c/w HCTZ 25mg daily - c/w Procardia XL to 90mg daily - May need to start Aldactone later on if BP remains uncontrolled  #DLD - Last lipid profile Dec 2023: TG 67, LDL 68, and HDL 36 - c/w Lipitor 40mg QPM - Diet and exercise   #Colonic polyps (tubular adenomas without dysplasia) - Last Colono October 2022 - 1 sigmoid colon polyp, 4mm s/p polypectomy - 2 Hepetic flexure polyps ( 5mm and 8 mm), s/p polypectomy and Endoclip - 1 Transverse colon 5mm s/p polypectomy - 1 in Rectum, 5mm, s/p polypectomy - ALL POLYPS were of Tubular Adenoma histologically except for rectal: Hyperplastic -Next colono due in Nov 2025  #GERD - improved #Voice hoarseness - improving - Due to acid reflux - On PPI and famotidine - Has an upper endoscopy with GI tomorrow  #Bilateral glaucoma/ legally blind - c/w eye drops (dont know name) - Ophtho referral - The patient lives alone, independent, he lives right [place] the [employer] where he works, he reports that he is very good social support and help from the [employer] community and friends, declines the need for social work at the moment  #Back pain - Chronic, no alarm symptoms - PT referral   #HCM - Colonoscopy due in Nov 2025 - Refused flu vaccine - No indication for lung cancer screening at the moment - RTC in 6 months and prn .

## 2024-01-17 NOTE — PHYSICAL EXAM
[No Acute Distress] : no acute distress [No JVD] : no jugular venous distention [No Respiratory Distress] : no respiratory distress  [Clear to Auscultation] : lungs were clear to auscultation bilaterally [Normal S1, S2] : normal S1 and S2 [Soft] : abdomen soft [Non-distended] : non-distended [de-identified] : trace LLE

## 2024-01-18 ENCOUNTER — OUTPATIENT (OUTPATIENT)
Dept: OUTPATIENT SERVICES | Facility: HOSPITAL | Age: 59
LOS: 1 days | Discharge: HOME | End: 2024-01-18
Payer: COMMERCIAL

## 2024-01-18 ENCOUNTER — RESULT REVIEW (OUTPATIENT)
Age: 59
End: 2024-01-18

## 2024-01-18 ENCOUNTER — TRANSCRIPTION ENCOUNTER (OUTPATIENT)
Age: 59
End: 2024-01-18

## 2024-01-18 VITALS
WEIGHT: 207.9 LBS | DIASTOLIC BLOOD PRESSURE: 76 MMHG | TEMPERATURE: 99 F | SYSTOLIC BLOOD PRESSURE: 167 MMHG | HEIGHT: 67 IN | RESPIRATION RATE: 18 BRPM | HEART RATE: 105 BPM

## 2024-01-18 VITALS
TEMPERATURE: 98 F | DIASTOLIC BLOOD PRESSURE: 69 MMHG | OXYGEN SATURATION: 96 % | HEART RATE: 73 BPM | SYSTOLIC BLOOD PRESSURE: 124 MMHG | RESPIRATION RATE: 20 BRPM

## 2024-01-18 DIAGNOSIS — Z00.00 ENCOUNTER FOR GENERAL ADULT MEDICAL EXAMINATION WITHOUT ABNORMAL FINDINGS: ICD-10-CM

## 2024-01-18 DIAGNOSIS — Z98.890 OTHER SPECIFIED POSTPROCEDURAL STATES: Chronic | ICD-10-CM

## 2024-01-18 DIAGNOSIS — E11.9 TYPE 2 DIABETES MELLITUS WITHOUT COMPLICATIONS: ICD-10-CM

## 2024-01-18 DIAGNOSIS — Z94.7 CORNEAL TRANSPLANT STATUS: Chronic | ICD-10-CM

## 2024-01-18 DIAGNOSIS — K21.9 GASTRO-ESOPHAGEAL REFLUX DISEASE WITHOUT ESOPHAGITIS: ICD-10-CM

## 2024-01-18 PROCEDURE — 88305 TISSUE EXAM BY PATHOLOGIST: CPT

## 2024-01-18 PROCEDURE — 43239 EGD BIOPSY SINGLE/MULTIPLE: CPT

## 2024-01-18 PROCEDURE — 88305 TISSUE EXAM BY PATHOLOGIST: CPT | Mod: 26

## 2024-01-18 PROCEDURE — 88312 SPECIAL STAINS GROUP 1: CPT

## 2024-01-18 PROCEDURE — 88312 SPECIAL STAINS GROUP 1: CPT | Mod: 26

## 2024-01-18 RX ORDER — NIFEDIPINE 30 MG
1 TABLET, EXTENDED RELEASE 24 HR ORAL
Refills: 0 | DISCHARGE

## 2024-01-18 RX ORDER — METFORMIN HYDROCHLORIDE 850 MG/1
1 TABLET ORAL
Qty: 0 | Refills: 0 | DISCHARGE

## 2024-01-18 RX ORDER — ASPIRIN/CALCIUM CARB/MAGNESIUM 324 MG
1 TABLET ORAL
Qty: 0 | Refills: 0 | DISCHARGE

## 2024-01-18 RX ORDER — IRBESARTAN AND HYDROCHLOROTHIAZIDE 12.5; 3 MG/1; MG/1
1 TABLET ORAL
Qty: 0 | Refills: 0 | DISCHARGE

## 2024-01-18 RX ORDER — ATORVASTATIN CALCIUM 80 MG/1
1 TABLET, FILM COATED ORAL
Qty: 0 | Refills: 0 | DISCHARGE

## 2024-01-18 RX ORDER — AMLODIPINE BESYLATE 2.5 MG/1
1 TABLET ORAL
Qty: 0 | Refills: 0 | DISCHARGE

## 2024-01-18 RX ORDER — METFORMIN HYDROCHLORIDE 850 MG/1
1 TABLET ORAL
Refills: 0 | DISCHARGE

## 2024-01-18 NOTE — CHART NOTE - NSCHARTNOTEFT_GEN_A_CORE
PACU ANESTHESIA ADMISSION NOTE      Procedure: EGD  Post op diagnosis:      ____  Intubated  TV:______       Rate: ______      FiO2: ______    X____  Patent Airway    X____  Full return of protective reflexes    ____  Full recovery from anesthesia / back to baseline status    Vitals:  T: 97.6F  HR:  82  BP: 112/59  RR: 17  SpO2: 95%    Mental Status:  _X___ Awake   _____ Alert   _____ Drowsy   _____ Sedated    Nausea/Vomiting:  _X___ NO  ______Yes,   See Post - Op Orders          Pain Scale (0-10):  ___0__    Treatment: ____ None    ___X_ See Post - Op/PCA Orders    Post - Operative Fluids:   ____ Oral   __X__ See Post - Op Orders    Plan: Discharge:   __X__Home       _____Floor     _____Critical Care    _____  Other:_________________    Comments: NO anesthetic related complications note.d

## 2024-01-18 NOTE — ASU DISCHARGE PLAN (ADULT/PEDIATRIC) - CARE PROVIDER_API CALL
Elton Porras  Gastroenterology  [address] [place] [address]  Phone: [phone]  Fax: [phone]  Follow Up Time: Routine

## 2024-01-18 NOTE — PRE-ANESTHESIA EVALUATION ADULT - NSDENTALSD_ENT_ALL_CORE
Problem: Depressive Symptoms  Goal: Depressive Symptoms  Signs and symptoms of listed problems will be absent or manageable.   Outcome: No Change  Pleasant and cooperative. More visible on the unit but withdrawn.  Attended wrap up group and participated with prompts. Pt states he is trying to adjust to being here. He was medically cleared for ECT.  Signed the informed consent, watched the ECT video, education about the procedure provided, and questions answered.  Cont to appear sad and depressed but actually smiled a couple times during conversation. Left leg wound remains dry and appears red around the area.        caps/bridge/implants

## 2024-01-18 NOTE — ASU DISCHARGE PLAN (ADULT/PEDIATRIC) - NS MD DC FALL RISK RISK
For information on Fall & Injury Prevention, visit: [email or web address] OR  [email or web address] OR  https://www.cdc.gov/steadi/patient.html

## 2024-01-18 NOTE — ASU DISCHARGE PLAN (ADULT/PEDIATRIC) - ACCOMPANIED BY
EKG completed and pt placed on heart monitor      [facility]  07/14/21 1911 EXAM NOTE     HISTORY:  Isai Guevara is a 62 year old male who presents or periodic physical exam and wellness visit.   Patient also here for follow up of multiple medical concerns and medication monitoring.  Patient complains of right testicular discomfort when he is sitting as it rises up into the groin area. 2. Will stand up and walk around and this will help. He has had history of bilateral hernia repair, vasectomy and TURP in the past.  Patient has chronic iron deficiency anemia and has been taking iron supplementation long-term. His last ferritin was a year ago and was 48.    VISIT PROBLEMS:  1. Physical exam, routine    2. Anemia due to chronic illness    3. Metabolic syndrome    4. Impaired fasting blood sugar    5. Dyslipidemia    6. Gastroesophageal reflux disease without esophagitis    7. Coronary artery disease involving native coronary artery of native heart with unstable angina pectoris (CMS/HCC)    8. Benign hypertension with chronic kidney disease, stage II    9. Obstructive sleep apnea (adult) (pediatric)    10. Adjustment disorder with mixed anxiety and depressed mood    11. Persistent insomnia    12. Right testicular pain        CO-MANAGING PROVIDERS:  Dr. zambrano, Dr. Cedeno, Dr. Nichole    VITALS:  Visit Vitals  /80   Pulse 68   Ht 5' 8\" (1.727 m)   Wt 96.2 kg   BMI 32.25 kg/m²       ALLERGIES:  ALLERGIES:  No Known Allergies    MEDICATIONS:  Current Outpatient Medications   Medication Sig Dispense Refill   • Ranolazine 1000 MG TABLET SR 12 HR TAKE 1 TABLET BY MOUTH TWICE DAILY 60 tablet 11   • mirtazapine (REMERON) 45 MG tablet TAKE 1 TABLET BY MOUTH EVERY DAY AT NIGHT 90 tablet 0   • isosorbide mononitrate (IMDUR) 60 MG 24 hr tablet Take 1.5 tablets by mouth daily. 270 tablet 3   • aspirin 81 MG tablet Take 1 tablet by mouth daily. 90 tablet 3   • rosuvastatin (CRESTOR) 40 MG tablet TAKE 1 TABLET BY MOUTH EVERY DAY 90 tablet 3   • metFORMIN (GLUCOPHAGE) 500 MG tablet TAKE 1 TABLET BY MOUTH  TWICE A DAY WITH FOOD 180 tablet 1   • clopidogrel (PLAVIX) 75 MG tablet TAKE 1 TABLET BY MOUTH EVERY DAY 90 tablet 1   • cholecalciferol (VITAMIN D3) 1000 UNITS tablet Take 2,000 Units by mouth daily. Take two 1000 tablets daily     • atenolol (TENORMIN) 25 MG tablet TAKE 1 TABLET BY MOUTH EVERY DAY IN THE EVENING 90 tablet 1   • rOPINIRole (REQUIP) 0.25 MG tablet TAKE 2 TABLETS BY MOUTH EVERY DAY AT NIGHT 60 tablet 11   • candesartan (ATACAND) 4 MG tablet TAKE 1/2 TABLET DAILY 45 tablet 1   • pantoprazole (PROTONIX) 40 MG tablet TAKE 1 TABLET DAILY 90 tablet 1   • FERROUS SULFATE PO Take 1 tablet by mouth daily.     • nitroGLYcerin (NITROSTAT) 0.4 MG SL tablet Place 1 tablet under the tongue every 5 minutes as needed for Chest pain. 90 tablet 12     No current facility-administered medications for this visit.        CHRONIC MEDICAL PROBLEMS:  Patient Active Problem List   Diagnosis   • Obstructive sleep apnea (adult) (pediatric)   • extensive coronary artery disease with multiple stents to them    • Dyslipidemia   • Physical exam, routine   • History of colon polyps   • GERD (gastroesophageal reflux disease)   • LVH (left ventricular hypertrophy)   • Diverticular disease   • Benign hypertension with chronic kidney disease, stage II   • Exertional chest pain   • Unstable angina (CMS/HCC)   • CAD (coronary artery disease)   • Prostate cancer screening   • Impaired fasting blood sugar   • Metabolic syndrome   • Anemia due to chronic illness   • Primary osteoarthritis of left knee   • Persistent insomnia   • S/P left TKA - 10/9/17   • Adjustment disorder with mixed anxiety and depressed mood   • Obesity (BMI 30.0-34.9)       PAST MEDICAL:  Past Medical History:   Diagnosis Date   • Anxiety    • Arthritis    • CAD (coronary artery disease)    • Deviated septum     s/p septoplasty   • Dyslipidemia    • GERD (gastroesophageal reflux disease)    • High cholesterol    • Hypertension    • Myocardial infarction (CMS/HCC)     • or equivalent per week     Frequency: 2-3 times a week     Drinks per session: 3 or 4     Binge frequency: Never   • Drug use: No   • Sexual activity: Not on file   Lifestyle   • Physical activity:     Days per week: Not on file     Minutes per session: Not on file   • Stress: Not on file   Relationships   • Social connections:     Talks on phone: Not on file     Gets together: Not on file     Attends Anabaptist service: Not on file     Active member of club or organization: Not on file     Attends meetings of clubs or organizations: Not on file     Relationship status: Not on file   • Intimate partner violence:     Fear of current or ex partner: Not on file     Emotionally abused: Not on file     Physically abused: Not on file     Forced sexual activity: Not on file   Other Topics Concern   •  Service Not Asked   • Blood Transfusions Not Asked   • Caffeine Concern Not Asked   • Occupational Exposure Not Asked   • Hobby Hazards Not Asked   • Sleep Concern Not Asked   • Stress Concern Not Asked   • Weight Concern Not Asked   • Special Diet Not Asked   • Back Care Not Asked   • Exercise Not Asked   • Bike Helmet Not Asked   • Seat Belt Yes   • Self-Exams Not Asked   Social History Narrative   • Not on file       FAMILY HISTORY:  Family History   Problem Relation Age of Onset   • Diabetes Mother    • Congestive Heart Failure Mother    • Hypertension Mother    • Diabetes Father    • Congestive Heart Failure Father    • Hypertension Father    • Diabetes Maternal Grandmother    • Cancer Maternal Grandfather        IMMUNIZATIONS:  Immunization History   Administered Date(s) Administered   • Influenza, seasonal, injectable, trivalent 10/15/2010, 11/01/2013, 10/15/2014, 11/11/2015, 11/01/2016, 11/01/2017   • Pneumococcal polysaccharide, adult, 23 valent 12/12/2008   • Tdap 04/05/2013   • Tetanus 04/22/2003   • Zoster Shingles 04/07/2017         NUTRITION:  Adequate    ACTIVITIES:  Accidents/Injuries:  None  Work:   [employer]  Hobbies:    Roman Catholic:  Weight is elevated.  Or. Other multiple medical concerns addressed today. Labs and meds reviewed.    Follow-up:  12 months  Labs prior:  CMP, CBC, ferritin, lipid profile, hemoglobin A1c, microalbuminuria will PSA               Friend

## 2024-01-18 NOTE — ASU PATIENT PROFILE, ADULT - FALL HARM RISK - HARM RISK INTERVENTIONS

## 2024-01-19 LAB — SURGICAL PATHOLOGY STUDY: SIGNIFICANT CHANGE UP

## 2024-01-23 DIAGNOSIS — E11.9 TYPE 2 DIABETES MELLITUS WITHOUT COMPLICATIONS: ICD-10-CM

## 2024-01-23 DIAGNOSIS — K21.9 GASTRO-ESOPHAGEAL REFLUX DISEASE WITHOUT ESOPHAGITIS: ICD-10-CM

## 2024-01-23 DIAGNOSIS — Z94.7 CORNEAL TRANSPLANT STATUS: ICD-10-CM

## 2024-01-23 DIAGNOSIS — K31.A0 GASTRIC INTESTINAL METAPLASIA, UNSPECIFIED: ICD-10-CM

## 2024-01-23 DIAGNOSIS — H54.7 UNSPECIFIED VISUAL LOSS: ICD-10-CM

## 2024-01-23 DIAGNOSIS — K29.50 UNSPECIFIED CHRONIC GASTRITIS WITHOUT BLEEDING: ICD-10-CM

## 2024-01-23 DIAGNOSIS — E78.00 PURE HYPERCHOLESTEROLEMIA, UNSPECIFIED: ICD-10-CM

## 2024-01-23 DIAGNOSIS — Z79.84 LONG TERM (CURRENT) USE OF ORAL HYPOGLYCEMIC DRUGS: ICD-10-CM

## 2024-01-23 DIAGNOSIS — H40.9 UNSPECIFIED GLAUCOMA: ICD-10-CM

## 2024-01-23 DIAGNOSIS — K44.9 DIAPHRAGMATIC HERNIA WITHOUT OBSTRUCTION OR GANGRENE: ICD-10-CM

## 2024-01-23 DIAGNOSIS — I10 ESSENTIAL (PRIMARY) HYPERTENSION: ICD-10-CM

## 2024-01-23 DIAGNOSIS — Z79.82 LONG TERM (CURRENT) USE OF ASPIRIN: ICD-10-CM

## 2024-01-29 ENCOUNTER — APPOINTMENT (OUTPATIENT)
Dept: OTOLARYNGOLOGY | Facility: CLINIC | Age: 59
End: 2024-01-29
Payer: COMMERCIAL

## 2024-01-29 PROCEDURE — 92507 TX SP LANG VOICE COMM INDIV: CPT | Mod: GN

## 2024-02-01 ENCOUNTER — APPOINTMENT (OUTPATIENT)
Dept: OTOLARYNGOLOGY | Facility: CLINIC | Age: 59
End: 2024-02-01
Payer: COMMERCIAL

## 2024-02-01 PROCEDURE — 92507 TX SP LANG VOICE COMM INDIV: CPT | Mod: GN

## 2024-02-07 ENCOUNTER — APPOINTMENT (OUTPATIENT)
Dept: GASTROENTEROLOGY | Facility: CLINIC | Age: 59
End: 2024-02-07
Payer: COMMERCIAL

## 2024-02-07 DIAGNOSIS — K63.5 POLYP OF COLON: ICD-10-CM

## 2024-02-07 DIAGNOSIS — K21.9 GASTRO-ESOPHAGEAL REFLUX DISEASE W/OUT ESOPHAGITIS: ICD-10-CM

## 2024-02-07 PROCEDURE — 99443: CPT

## 2024-02-07 NOTE — ASSESSMENT
[FreeTextEntry1] :         Acid reflux - Continue PPI in am and Famotidine at hs - Will do an EGD; risks and benefits discussed  Colon polyps, TAs 10/22 without HGD, H/O 3 cm polyp with dysplasia 2017 - Repeat colonoscopy in 2 yrs.

## 2024-02-07 NOTE — REASON FOR VISIT
[Home] : at home, [unfilled] , at the time of the visit. [Medical Office: ([facility])___] : at the medical office located in  [Verbal consent obtained from patient] : the patient, [unfilled] [FreeTextEntry4] : Jacquelin DURAN

## 2024-02-07 NOTE — HISTORY OF PRESENT ILLNESS
[FreeTextEntry1] : 58 yr old male with H/O colon polyps that were tubular adenomas, C/O heartburn and has been taking pantoprazole and Famotidine for the past 1 1/2 yrs and is here for F/U post EGD. He stated that the medications are controlling his heartburn. He has never had an EGD done. He denied dysphagia, vomiting, abd pain, constipation, diarrhea, or weight loss.

## 2024-02-07 NOTE — REASON FOR VISIT
[Home] : at home, [unfilled] , at the time of the visit. [Medical Office: ([facility])___] : at the medical office located in  [Verbal consent obtained from patient] : the patient, [unfilled] [FreeTextEntry4] : Jacquelin DURAN

## 2024-02-07 NOTE — REVIEW OF SYSTEMS
[Hoarseness] : hoarseness [Abdominal Pain] : no abdominal pain [Vomiting] : no vomiting [Constipation] : no constipation [Diarrhea] : no diarrhea [Heartburn] : heartburn [Melena (black stool)] : no melena [Fecal Incontinence (soiling)] : no fecal incontinence [Swollen Glands] : no swollen glands [Negative] : Heme/Lymph

## 2024-02-08 ENCOUNTER — APPOINTMENT (OUTPATIENT)
Dept: OTOLARYNGOLOGY | Facility: CLINIC | Age: 59
End: 2024-02-08
Payer: COMMERCIAL

## 2024-02-08 PROCEDURE — 92507 TX SP LANG VOICE COMM INDIV: CPT | Mod: GN

## 2024-02-15 ENCOUNTER — APPOINTMENT (OUTPATIENT)
Dept: OTOLARYNGOLOGY | Facility: CLINIC | Age: 59
End: 2024-02-15
Payer: COMMERCIAL

## 2024-02-15 PROCEDURE — 92507 TX SP LANG VOICE COMM INDIV: CPT | Mod: GN

## 2024-02-15 RX ORDER — BRIMONIDINE TARTRATE, TIMOLOL MALEATE 2; 5 MG/ML; MG/ML
0.2-0.5 SOLUTION/ DROPS TOPICAL TWICE DAILY
Qty: 1 | Refills: 3 | Status: ACTIVE | COMMUNITY
Start: 2020-02-10 | End: 1900-01-01

## 2024-03-07 ENCOUNTER — APPOINTMENT (OUTPATIENT)
Dept: OTOLARYNGOLOGY | Facility: CLINIC | Age: 59
End: 2024-03-07
Payer: COMMERCIAL

## 2024-03-07 PROCEDURE — 92507 TX SP LANG VOICE COMM INDIV: CPT | Mod: GN

## 2024-03-21 ENCOUNTER — APPOINTMENT (OUTPATIENT)
Dept: OTOLARYNGOLOGY | Facility: CLINIC | Age: 59
End: 2024-03-21

## 2024-04-03 ENCOUNTER — APPOINTMENT (OUTPATIENT)
Dept: OTOLARYNGOLOGY | Facility: CLINIC | Age: 59
End: 2024-04-03
Payer: COMMERCIAL

## 2024-04-03 VITALS — BODY MASS INDEX: 31.52 KG/M2 | WEIGHT: 208 LBS | HEIGHT: 68 IN

## 2024-04-03 DIAGNOSIS — K21.9 GASTRO-ESOPHAGEAL REFLUX DISEASE W/OUT ESOPHAGITIS: ICD-10-CM

## 2024-04-03 DIAGNOSIS — R49.0 DYSPHONIA: ICD-10-CM

## 2024-04-03 DIAGNOSIS — J38.4 EDEMA OF LARYNX: ICD-10-CM

## 2024-04-03 PROCEDURE — 92507 TX SP LANG VOICE COMM INDIV: CPT | Mod: GN

## 2024-04-03 PROCEDURE — 31575 DIAGNOSTIC LARYNGOSCOPY: CPT

## 2024-04-03 PROCEDURE — 99214 OFFICE O/P EST MOD 30 MIN: CPT | Mod: 25

## 2024-04-03 RX ORDER — PANTOPRAZOLE 40 MG/1
40 TABLET, DELAYED RELEASE ORAL TWICE DAILY
Qty: 180 | Refills: 3 | Status: ACTIVE | COMMUNITY
Start: 2023-11-01 | End: 1900-01-01

## 2024-04-03 NOTE — HISTORY OF PRESENT ILLNESS
[FreeTextEntry1] : Patient following up on hoarseness, LPRD, laryngeal edema , acid reflux.  Patient has been seeing F[facility]andary  for speech therapy with some improvement. Patient  had a endoscopy [facility] on 01/18/2024. Had endoscopy with MD Porras and was told to have acid reflux and hernia, was told to stop famotidine. Taking levocetirizine as needed. Continues pantoprazole daily. Speecha nd GI notes reviewed 3/24 improved

## 2024-04-03 NOTE — ASSESSMENT
[FreeTextEntry1] : Pt will continue with vocal exercises as per voice therapy.  Pt is recommended voice rest, hydration, and low acid diet.

## 2024-04-03 NOTE — PROCEDURE
[None] : none [Flexible Endoscope] : examined with the flexible endoscope [Glottis Arytenoid Cartilages Erythema] : bilateral arytenoid ~M erythema [Normal] : posterior cricoid area had healthy pink mucosa in the interarytenoid area and the esophageal inlet

## 2024-04-16 ENCOUNTER — APPOINTMENT (OUTPATIENT)
Dept: NEPHROLOGY | Facility: CLINIC | Age: 59
End: 2024-04-16
Payer: COMMERCIAL

## 2024-04-16 ENCOUNTER — RX RENEWAL (OUTPATIENT)
Age: 59
End: 2024-04-16

## 2024-04-16 ENCOUNTER — OUTPATIENT (OUTPATIENT)
Dept: OUTPATIENT SERVICES | Facility: HOSPITAL | Age: 59
LOS: 1 days | End: 2024-04-16
Payer: COMMERCIAL

## 2024-04-16 VITALS
HEIGHT: 68 IN | SYSTOLIC BLOOD PRESSURE: 138 MMHG | DIASTOLIC BLOOD PRESSURE: 83 MMHG | TEMPERATURE: 97.3 F | WEIGHT: 208 LBS | BODY MASS INDEX: 31.52 KG/M2 | HEART RATE: 78 BPM | OXYGEN SATURATION: 98 %

## 2024-04-16 DIAGNOSIS — I10 ESSENTIAL (PRIMARY) HYPERTENSION: ICD-10-CM

## 2024-04-16 DIAGNOSIS — E11.9 TYPE 2 DIABETES MELLITUS WITHOUT COMPLICATIONS: ICD-10-CM

## 2024-04-16 DIAGNOSIS — Z94.7 CORNEAL TRANSPLANT STATUS: Chronic | ICD-10-CM

## 2024-04-16 DIAGNOSIS — Z98.890 OTHER SPECIFIED POSTPROCEDURAL STATES: Chronic | ICD-10-CM

## 2024-04-16 DIAGNOSIS — E78.5 HYPERLIPIDEMIA, UNSPECIFIED: ICD-10-CM

## 2024-04-16 DIAGNOSIS — Z00.00 ENCOUNTER FOR GENERAL ADULT MEDICAL EXAMINATION WITHOUT ABNORMAL FINDINGS: ICD-10-CM

## 2024-04-16 PROCEDURE — 99214 OFFICE O/P EST MOD 30 MIN: CPT

## 2024-04-16 RX ORDER — CHLORTHALIDONE 25 MG/1
25 TABLET ORAL DAILY
Qty: 30 | Refills: 3 | Status: ACTIVE | COMMUNITY
Start: 2024-04-16 | End: 1900-01-01

## 2024-04-16 RX ORDER — NIFEDIPINE 90 MG/1
90 TABLET, EXTENDED RELEASE ORAL
Qty: 30 | Refills: 6 | Status: COMPLETED | COMMUNITY
Start: 2023-12-19 | End: 2024-04-16

## 2024-04-16 RX ORDER — NIFEDIPINE 60 MG/1
60 TABLET, FILM COATED, EXTENDED RELEASE ORAL DAILY
Qty: 30 | Refills: 3 | Status: ACTIVE | COMMUNITY
Start: 2024-04-16 | End: 1900-01-01

## 2024-04-16 RX ORDER — HYDROCHLOROTHIAZIDE 25 MG/1
25 TABLET ORAL DAILY
Qty: 30 | Refills: 6 | Status: COMPLETED | COMMUNITY
Start: 2023-12-19 | End: 2024-04-16

## 2024-04-16 NOTE — ASSESSMENT
[FreeTextEntry1] : 59 y/o M resistant HTN, DM, DLD, legally blind 2/2 glaucoma presenting to clinic for follow up.  # Resistant hypertension *aldosterone 17.9, renin 35.3 not c/w hyperaldo with PAR< 15 *RBUS 01/19/2023: Right kidney: 11.4 x 5.2 cm. 1.1 cm partially exophytic simple cyst. Left kidney:11.7 cm. No hydronephrosis or calculi.  - BP today: 138/83 - Cr 0.9 eGFR 99 (Dec 2023) - Albumin urine <1.2 (Dec 2023)  - currently on procardia 90mg, irebesartan 300mg daily, and HCTZ 25mg daily - will switch HCTZ to chlorthalidone 25mg daily  - can decrease procardia back to 60mg and monitor response  - counseled on taking irebesartan at night for better BP control   RTC in 4 months

## 2024-04-16 NOTE — END OF VISIT
[] : Resident [FreeTextEntry3] : Seen and examined with resident.  Chart reviewed.  Patient reports better control of BP at home and in clinic on higher dose of Nifedipine, but has been experiencing ankle swelling and jitters with new dose.  Renal function stable and HgbA1c wnl.  Will plan to reduce Nifedipine XL back to 60mg daily, and switch HCTZ to Chlorthalidone 25mg daily.  Advised patient to take Nifedipine and Chlorthalidone in the morning, and take Irbesartan in the evening for overnight BP control which provides better cardioprotection.  RTC 3-4 months. [Time Spent: ___ minutes] : I have spent [unfilled] minutes of time on the encounter.

## 2024-04-16 NOTE — HISTORY OF PRESENT ILLNESS
[FreeTextEntry1] : 57 y/o M resistant HTN, DM, DLD, legally blind 2/2 glaucoma presenting as follow up for resistant HTN. Patient last seen in Dec 2023, presented in hypertensive urgency and required dose of 0.1mg Clonidine in office and nifedipine was increased to 90mg daily. He feels that increasing the medication has made him more jittery and giving him leg cramps.   Patient states that typically at home he runs in the 130s systolic. He goes swimming twice a week and checks his BP at that time. Denies chest pain, SOB, abdominal pain, N/V/D, urinary symptoms, headaches, dizziness, weakness.

## 2024-04-16 NOTE — PHYSICAL EXAM
[General Appearance - Alert] : alert [General Appearance - In No Acute Distress] : in no acute distress [General Appearance - Well Nourished] : well nourished [Auscultation Breath Sounds / Voice Sounds] : lungs were clear to auscultation bilaterally [Heart Rate And Rhythm] : heart rate was normal and rhythm regular [Heart Sounds] : normal S1 and S2 [Bowel Sounds] : normal bowel sounds [Abdomen Tenderness] : non-tender [Skin Turgor] : normal skin turgor [] : no rash [Oriented To Time, Place, And Person] : oriented to person, place, and time [Impaired Insight] : insight and judgment were intact [Affect] : the affect was normal [FreeTextEntry1] : 1+ pitting edema b/l LE

## 2024-04-18 ENCOUNTER — APPOINTMENT (OUTPATIENT)
Dept: OTOLARYNGOLOGY | Facility: CLINIC | Age: 59
End: 2024-04-18
Payer: COMMERCIAL

## 2024-04-18 PROCEDURE — 92507 TX SP LANG VOICE COMM INDIV: CPT | Mod: GN

## 2024-04-25 ENCOUNTER — APPOINTMENT (OUTPATIENT)
Dept: OTOLARYNGOLOGY | Facility: CLINIC | Age: 59
End: 2024-04-25
Payer: COMMERCIAL

## 2024-04-25 PROCEDURE — 92507 TX SP LANG VOICE COMM INDIV: CPT | Mod: GN

## 2024-04-29 ENCOUNTER — OUTPATIENT (OUTPATIENT)
Dept: OUTPATIENT SERVICES | Facility: HOSPITAL | Age: 59
LOS: 1 days | End: 2024-04-29
Payer: COMMERCIAL

## 2024-04-29 ENCOUNTER — APPOINTMENT (OUTPATIENT)
Dept: PODIATRY | Facility: CLINIC | Age: 59
End: 2024-04-29
Payer: COMMERCIAL

## 2024-04-29 DIAGNOSIS — L60.0 INGROWING NAIL: ICD-10-CM

## 2024-04-29 DIAGNOSIS — Z98.890 OTHER SPECIFIED POSTPROCEDURAL STATES: Chronic | ICD-10-CM

## 2024-04-29 DIAGNOSIS — Z94.7 CORNEAL TRANSPLANT STATUS: Chronic | ICD-10-CM

## 2024-04-29 DIAGNOSIS — R26.2 DIFFICULTY IN WALKING, NOT ELSEWHERE CLASSIFIED: ICD-10-CM

## 2024-04-29 DIAGNOSIS — Z00.00 ENCOUNTER FOR GENERAL ADULT MEDICAL EXAMINATION WITHOUT ABNORMAL FINDINGS: ICD-10-CM

## 2024-04-29 PROCEDURE — 11721 DEBRIDE NAIL 6 OR MORE: CPT | Mod: GC

## 2024-04-29 PROCEDURE — 11721 DEBRIDE NAIL 6 OR MORE: CPT

## 2024-04-30 PROBLEM — R26.2 DIFFICULTY IN WALKING INVOLVING ANKLE AND FOOT JOINT: Status: ACTIVE | Noted: 2023-09-25

## 2024-04-30 PROBLEM — L60.0 ONYCHOCRYPTOSIS: Status: ACTIVE | Noted: 2023-09-25

## 2024-04-30 NOTE — PHYSICAL EXAM
[General Appearance - Alert] : alert [General Appearance - In No Acute Distress] : in no acute distress [2+] : left foot dorsalis pedis 2+ [Normal Foot/Ankle] : Both lower extremities were exposed and visualized. Standing exam demonstrates normal foot posture and alignment. Hindfoot exam shows no hindfoot valgus or varus [Oriented To Time, Place, And Person] : oriented to person, place, and time [Impaired Insight] : insight and judgment were intact [Foot Ulcer] : no foot ulcer [Skin Induration] : no skin induration [FreeTextEntry1] : incurvated nails x 10 [Vibration Dec.] : normal vibratory sensation at the level of the toes [Diminished Throughout Right Foot] : normal sensation with monofilament testing throughout right foot [Diminished Throughout Left Foot] : normal sensation with monofilament testing throughout left foot

## 2024-04-30 NOTE — HISTORY OF PRESENT ILLNESS
[FreeTextEntry1] : 58 year old M presents for management of nail dystrophy, last DM foot eval was 6/2023

## 2024-05-01 DIAGNOSIS — R26.2 DIFFICULTY IN WALKING, NOT ELSEWHERE CLASSIFIED: ICD-10-CM
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
## 2024-05-01 DIAGNOSIS — L60.0 INGROWING NAIL: ICD-10-CM

## 2024-05-02 ENCOUNTER — APPOINTMENT (OUTPATIENT)
Dept: OTOLARYNGOLOGY | Facility: CLINIC | Age: 59
End: 2024-05-02

## 2024-05-16 ENCOUNTER — APPOINTMENT (OUTPATIENT)
Dept: OTOLARYNGOLOGY | Facility: CLINIC | Age: 59
End: 2024-05-16

## 2024-05-30 ENCOUNTER — APPOINTMENT (OUTPATIENT)
Dept: OTOLARYNGOLOGY | Facility: CLINIC | Age: 59
End: 2024-05-30
Payer: COMMERCIAL

## 2024-05-30 PROCEDURE — 92507 TX SP LANG VOICE COMM INDIV: CPT | Mod: GN

## 2024-06-13 ENCOUNTER — APPOINTMENT (OUTPATIENT)
Dept: OTOLARYNGOLOGY | Facility: CLINIC | Age: 59
End: 2024-06-13
Payer: COMMERCIAL

## 2024-06-13 PROCEDURE — 92507 TX SP LANG VOICE COMM INDIV: CPT | Mod: GN

## 2024-06-19 ENCOUNTER — RX RENEWAL (OUTPATIENT)
Age: 59
End: 2024-06-19

## 2024-06-19 RX ORDER — LEVOCETIRIZINE DIHYDROCHLORIDE 5 MG/1
5 TABLET ORAL DAILY
Qty: 30 | Refills: 2 | Status: ACTIVE | COMMUNITY
Start: 2022-03-29 | End: 1900-01-01

## 2024-06-24 ENCOUNTER — OUTPATIENT (OUTPATIENT)
Dept: OUTPATIENT SERVICES | Facility: HOSPITAL | Age: 59
LOS: 1 days | End: 2024-06-24
Payer: COMMERCIAL

## 2024-06-24 DIAGNOSIS — Z00.00 ENCOUNTER FOR GENERAL ADULT MEDICAL EXAMINATION WITHOUT ABNORMAL FINDINGS: ICD-10-CM

## 2024-06-24 DIAGNOSIS — Z98.890 OTHER SPECIFIED POSTPROCEDURAL STATES: Chronic | ICD-10-CM

## 2024-06-24 DIAGNOSIS — Z94.7 CORNEAL TRANSPLANT STATUS: Chronic | ICD-10-CM

## 2024-06-24 LAB
25(OH)D3 SERPL-MCNC: 59 NG/ML
ALBUMIN SERPL ELPH-MCNC: 4.8 G/DL
ALP BLD-CCNC: 73 U/L
ALT SERPL-CCNC: 23 U/L
ANION GAP SERPL CALC-SCNC: 14 MMOL/L
AST SERPL-CCNC: 17 U/L
BILIRUB SERPL-MCNC: 0.5 MG/DL
BUN SERPL-MCNC: 10 MG/DL
CALCIUM SERPL-MCNC: 10.1 MG/DL
CHLORIDE SERPL-SCNC: 94 MMOL/L
CHOLEST SERPL-MCNC: 127 MG/DL
CO2 SERPL-SCNC: 28 MMOL/L
CREAT SERPL-MCNC: 0.9 MG/DL
EGFR: 99 ML/MIN/1.73M2
ESTIMATED AVERAGE GLUCOSE: 128 MG/DL
GLUCOSE SERPL-MCNC: 106 MG/DL
HBA1C MFR BLD HPLC: 6.1 %
HCT VFR BLD CALC: 43.1 %
HDLC SERPL-MCNC: 31 MG/DL
HGB BLD-MCNC: 14.9 G/DL
LDLC SERPL CALC-MCNC: 66 MG/DL
MCHC RBC-ENTMCNC: 28.7 PG
MCHC RBC-ENTMCNC: 34.6 G/DL
MCV RBC AUTO: 83 FL
NONHDLC SERPL-MCNC: 96 MG/DL
PLATELET # BLD AUTO: 300 K/UL
PMV BLD AUTO: 0 /100 WBCS
PMV BLD: 10.2 FL
POTASSIUM SERPL-SCNC: 4.3 MMOL/L
PROT SERPL-MCNC: 7.2 G/DL
RBC # BLD: 5.19 M/UL
RBC # FLD: 12 %
SODIUM SERPL-SCNC: 136 MMOL/L
TRIGL SERPL-MCNC: 148 MG/DL
WBC # FLD AUTO: 9.52 K/UL

## 2024-06-24 PROCEDURE — 83036 HEMOGLOBIN GLYCOSYLATED A1C: CPT

## 2024-06-24 PROCEDURE — 80061 LIPID PANEL: CPT

## 2024-06-24 PROCEDURE — 85027 COMPLETE CBC AUTOMATED: CPT

## 2024-06-24 PROCEDURE — 80053 COMPREHEN METABOLIC PANEL: CPT

## 2024-06-24 PROCEDURE — 82306 VITAMIN D 25 HYDROXY: CPT

## 2024-06-24 PROCEDURE — 36415 COLL VENOUS BLD VENIPUNCTURE: CPT

## 2024-06-25 DIAGNOSIS — Z00.00 ENCOUNTER FOR GENERAL ADULT MEDICAL EXAMINATION WITHOUT ABNORMAL FINDINGS: ICD-10-CM

## 2024-07-17 ENCOUNTER — APPOINTMENT (OUTPATIENT)
Dept: INTERNAL MEDICINE | Facility: CLINIC | Age: 59
End: 2024-07-17
Payer: COMMERCIAL

## 2024-07-17 ENCOUNTER — OUTPATIENT (OUTPATIENT)
Dept: OUTPATIENT SERVICES | Facility: HOSPITAL | Age: 59
LOS: 1 days | End: 2024-07-17
Payer: COMMERCIAL

## 2024-07-17 VITALS
TEMPERATURE: 98.2 F | DIASTOLIC BLOOD PRESSURE: 89 MMHG | WEIGHT: 208 LBS | HEART RATE: 74 BPM | HEIGHT: 68 IN | SYSTOLIC BLOOD PRESSURE: 164 MMHG | OXYGEN SATURATION: 99 % | BODY MASS INDEX: 31.52 KG/M2

## 2024-07-17 VITALS — TEMPERATURE: 98.2 F | DIASTOLIC BLOOD PRESSURE: 88 MMHG | SYSTOLIC BLOOD PRESSURE: 157 MMHG

## 2024-07-17 DIAGNOSIS — Z94.7 CORNEAL TRANSPLANT STATUS: Chronic | ICD-10-CM

## 2024-07-17 DIAGNOSIS — Z00.00 ENCOUNTER FOR GENERAL ADULT MEDICAL EXAMINATION WITHOUT ABNORMAL FINDINGS: ICD-10-CM

## 2024-07-17 DIAGNOSIS — E11.9 TYPE 2 DIABETES MELLITUS W/OUT COMPLICATIONS: ICD-10-CM

## 2024-07-17 DIAGNOSIS — M25.579 PAIN IN UNSPECIFIED ANKLE AND JOINTS OF UNSPECIFIED FOOT: ICD-10-CM

## 2024-07-17 DIAGNOSIS — Z98.890 OTHER SPECIFIED POSTPROCEDURAL STATES: Chronic | ICD-10-CM

## 2024-07-17 DIAGNOSIS — I10 ESSENTIAL (PRIMARY) HYPERTENSION: ICD-10-CM

## 2024-07-17 PROCEDURE — G2211 COMPLEX E/M VISIT ADD ON: CPT

## 2024-07-17 PROCEDURE — 99214 OFFICE O/P EST MOD 30 MIN: CPT

## 2024-07-18 DIAGNOSIS — I10 ESSENTIAL (PRIMARY) HYPERTENSION: ICD-10-CM

## 2024-07-18 DIAGNOSIS — E11.9 TYPE 2 DIABETES MELLITUS WITHOUT COMPLICATIONS: ICD-10-CM

## 2024-08-13 ENCOUNTER — APPOINTMENT (OUTPATIENT)
Dept: PODIATRY | Facility: CLINIC | Age: 59
End: 2024-08-13
Payer: COMMERCIAL

## 2024-08-13 ENCOUNTER — RESULT REVIEW (OUTPATIENT)
Age: 59
End: 2024-08-13

## 2024-08-13 ENCOUNTER — APPOINTMENT (OUTPATIENT)
Dept: NEPHROLOGY | Facility: CLINIC | Age: 59
End: 2024-08-13
Payer: COMMERCIAL

## 2024-08-13 VITALS
TEMPERATURE: 98 F | SYSTOLIC BLOOD PRESSURE: 152 MMHG | DIASTOLIC BLOOD PRESSURE: 77 MMHG | BODY MASS INDEX: 30.16 KG/M2 | HEIGHT: 68 IN | HEART RATE: 76 BPM | WEIGHT: 199 LBS

## 2024-08-13 DIAGNOSIS — E11.9 TYPE 2 DIABETES MELLITUS W/OUT COMPLICATIONS: ICD-10-CM

## 2024-08-13 DIAGNOSIS — Z00.00 ENCOUNTER FOR GENERAL ADULT MEDICAL EXAMINATION W/OUT ABNORMAL FINDINGS: ICD-10-CM

## 2024-08-13 DIAGNOSIS — M25.579 PAIN IN UNSPECIFIED ANKLE AND JOINTS OF UNSPECIFIED FOOT: ICD-10-CM

## 2024-08-13 PROCEDURE — 99214 OFFICE O/P EST MOD 30 MIN: CPT

## 2024-08-13 PROCEDURE — 99213 OFFICE O/P EST LOW 20 MIN: CPT | Mod: GC

## 2024-08-13 RX ORDER — DICLOFENAC SODIUM 20 MG/G
2 SOLUTION TOPICAL
Qty: 1 | Refills: 1 | Status: ACTIVE | COMMUNITY
Start: 2024-08-13 | End: 1900-01-01

## 2024-08-13 RX ORDER — SPIRONOLACTONE 25 MG/1
25 TABLET ORAL DAILY
Qty: 90 | Refills: 0 | Status: ACTIVE | COMMUNITY
Start: 2024-08-13 | End: 1900-01-01

## 2024-08-13 NOTE — END OF VISIT
[] : Resident [FreeTextEntry3] : Patient seen and examined with resident, chart reviewed.  No LUTS or uremic symptoms noted on encounter today.  Noted poor control of BP in clinic today, though patient reports good BP control at home, ?white coat effect (although patient is blind).  Will add Aldactone to his antihypertensive regimen, and add it to the morning doses of Procardia and Chlorthalidone, continuing Irbesartan at night.  Repeat labwork in 1 month and RTC in 2 months. [Time Spent: ___ minutes] : I have spent [unfilled] minutes of time on the encounter.

## 2024-08-13 NOTE — HISTORY OF PRESENT ILLNESS
[FreeTextEntry1] : 58 y/o M resistant HTN, DM, DLD, legally blind 2/2 glaucoma presenting as follow up for resistant HTN. Patient last seen in Dec 2023, presented in hypertensive urgency and required dose of 0.1mg Clonidine in office and nifedipine was increased to 90mg daily.  Patient states that typically at home he runs in the 130s systolic. He goes swimming twice a week and checks his BP at that time. Denies chest pain, SOB, abdominal pain, N/V/D, urinary symptoms, headaches, dizziness, weakness.

## 2024-08-13 NOTE — ASSESSMENT
[FreeTextEntry1] : # Resistant hypertension *aldosterone 17.9, renin 35.3 not c/w hyperaldo with PAR< 15 *RBUS 01/19/2023: Right kidney: 11.4 x 5.2 cm. 1.1 cm partially exophytic simple cyst. Left kidney:11.7 cm. No hydronephrosis or calculi. - BP today:  146/77 on repeat  - Cr 0.9 eGFR 99 (01 2024) - Albumin urine <1.2 (Dec 2023) - currently on procardia 60mg, irebesartan 300mg daily, and chlorthalidone 25 mg daily  - will add aldactone 25  RTC in 1 month

## 2024-08-16 NOTE — HISTORY OF PRESENT ILLNESS
[FreeTextEntry1] : 58 year old M presents for 6-month diabetic evaluation follow up.  last DM foot eval was 04/2024. also complaints of right ankle pain

## 2024-08-16 NOTE — PHYSICAL EXAM
[General Appearance - Alert] : alert [General Appearance - In No Acute Distress] : in no acute distress [2+] : left foot dorsalis pedis 2+ [Normal Foot/Ankle] : Both lower extremities were exposed and visualized. Standing exam demonstrates normal foot posture and alignment. Hindfoot exam shows no hindfoot valgus or varus [Skin Color & Pigmentation] : normal skin color and pigmentation [Skin Turgor] : normal skin turgor [] : no rash [Skin Lesions] : no skin lesions [Oriented To Time, Place, And Person] : oriented to person, place, and time [Impaired Insight] : insight and judgment were intact [Foot Ulcer] : no foot ulcer [Skin Induration] : no skin induration [Vibration Dec.] : normal vibratory sensation at the level of the toes [Diminished Throughout Right Foot] : normal sensation with monofilament testing throughout right foot [Diminished Throughout Left Foot] : normal sensation with monofilament testing throughout left foot [FreeTextEntry1] : 10/10 w/ [facility]

## 2024-08-16 NOTE — END OF VISIT
[Resident] : Resident [FreeTextEntry2] : -nails debrided to patients tolerance [] : Resident [FreeTextEntry3] : diabetic neurovascular exam performed right ankle pain. worse with cold/rainy weather, like OA -rx diclofenac My notes supersedes the above resident documentation in case of discrepancy. Correction for their notes is in my notes.

## 2024-08-16 NOTE — ASSESSMENT
[Verbal] : verbal [Patient] : patient [Good - alert, interested, motivated] : Good - alert, interested, motivated [FreeTextEntry1] : Diabetic foot evaluation HgA1c; 6.1% measured in 06/2024 Right lateral ankle pain;  Rx; Voltaren gel qd prn  Recommend; Right ankle xray; r/o arthritis   RTC 6 months

## 2024-10-08 ENCOUNTER — RX RENEWAL (OUTPATIENT)
Age: 59
End: 2024-10-08

## 2024-10-10 ENCOUNTER — APPOINTMENT (OUTPATIENT)
Dept: OTOLARYNGOLOGY | Facility: CLINIC | Age: 59
End: 2024-10-10
Payer: COMMERCIAL

## 2024-10-10 VITALS — WEIGHT: 199 LBS | HEIGHT: 68 IN | BODY MASS INDEX: 30.16 KG/M2

## 2024-10-10 DIAGNOSIS — J38.2 NODULES OF VOCAL CORDS: ICD-10-CM

## 2024-10-10 DIAGNOSIS — R49.0 DYSPHONIA: ICD-10-CM

## 2024-10-10 DIAGNOSIS — J38.4 EDEMA OF LARYNX: ICD-10-CM

## 2024-10-10 DIAGNOSIS — K21.9 GASTRO-ESOPHAGEAL REFLUX DISEASE W/OUT ESOPHAGITIS: ICD-10-CM

## 2024-10-10 PROCEDURE — 99213 OFFICE O/P EST LOW 20 MIN: CPT | Mod: 25

## 2024-10-10 PROCEDURE — 31575 DIAGNOSTIC LARYNGOSCOPY: CPT

## 2024-11-08 ENCOUNTER — RX RENEWAL (OUTPATIENT)
Age: 59
End: 2024-11-08

## 2024-11-21 ENCOUNTER — APPOINTMENT (OUTPATIENT)
Dept: OTOLARYNGOLOGY | Facility: CLINIC | Age: 59
End: 2024-11-21

## 2024-12-12 ENCOUNTER — RX RENEWAL (OUTPATIENT)
Age: 59
End: 2024-12-12

## 2024-12-16 ENCOUNTER — APPOINTMENT (OUTPATIENT)
Dept: OTOLARYNGOLOGY | Facility: CLINIC | Age: 59
End: 2024-12-16

## 2024-12-17 ENCOUNTER — APPOINTMENT (OUTPATIENT)
Dept: NEPHROLOGY | Facility: CLINIC | Age: 59
End: 2024-12-17
Payer: COMMERCIAL

## 2024-12-17 ENCOUNTER — OUTPATIENT (OUTPATIENT)
Dept: OUTPATIENT SERVICES | Facility: HOSPITAL | Age: 59
LOS: 1 days | End: 2024-12-17
Payer: COMMERCIAL

## 2024-12-17 VITALS
SYSTOLIC BLOOD PRESSURE: 167 MMHG | HEIGHT: 68 IN | OXYGEN SATURATION: 96 % | TEMPERATURE: 98.1 F | BODY MASS INDEX: 30.31 KG/M2 | DIASTOLIC BLOOD PRESSURE: 73 MMHG | HEART RATE: 61 BPM | WEIGHT: 200 LBS

## 2024-12-17 VITALS — DIASTOLIC BLOOD PRESSURE: 68 MMHG | SYSTOLIC BLOOD PRESSURE: 148 MMHG

## 2024-12-17 DIAGNOSIS — Z94.7 CORNEAL TRANSPLANT STATUS: Chronic | ICD-10-CM

## 2024-12-17 DIAGNOSIS — Z98.890 OTHER SPECIFIED POSTPROCEDURAL STATES: Chronic | ICD-10-CM

## 2024-12-17 DIAGNOSIS — Z00.00 ENCOUNTER FOR GENERAL ADULT MEDICAL EXAMINATION WITHOUT ABNORMAL FINDINGS: ICD-10-CM

## 2024-12-17 DIAGNOSIS — I10 ESSENTIAL (PRIMARY) HYPERTENSION: ICD-10-CM

## 2024-12-17 PROCEDURE — 99214 OFFICE O/P EST MOD 30 MIN: CPT

## 2024-12-19 DIAGNOSIS — I10 ESSENTIAL (PRIMARY) HYPERTENSION: ICD-10-CM

## 2025-01-03 ENCOUNTER — APPOINTMENT (OUTPATIENT)
Dept: OTOLARYNGOLOGY | Facility: CLINIC | Age: 60
End: 2025-01-03

## 2025-01-07 ENCOUNTER — RX RENEWAL (OUTPATIENT)
Age: 60
End: 2025-01-07

## 2025-01-21 ENCOUNTER — OUTPATIENT (OUTPATIENT)
Dept: OUTPATIENT SERVICES | Facility: HOSPITAL | Age: 60
LOS: 1 days | End: 2025-01-21
Payer: COMMERCIAL

## 2025-01-21 ENCOUNTER — APPOINTMENT (OUTPATIENT)
Dept: INTERNAL MEDICINE | Facility: CLINIC | Age: 60
End: 2025-01-21
Payer: COMMERCIAL

## 2025-01-21 VITALS
DIASTOLIC BLOOD PRESSURE: 81 MMHG | OXYGEN SATURATION: 99 % | WEIGHT: 203 LBS | TEMPERATURE: 98.1 F | HEART RATE: 69 BPM | BODY MASS INDEX: 30.77 KG/M2 | SYSTOLIC BLOOD PRESSURE: 172 MMHG | HEIGHT: 68 IN

## 2025-01-21 VITALS — DIASTOLIC BLOOD PRESSURE: 77 MMHG | SYSTOLIC BLOOD PRESSURE: 166 MMHG

## 2025-01-21 DIAGNOSIS — M54.9 DORSALGIA, UNSPECIFIED: ICD-10-CM

## 2025-01-21 DIAGNOSIS — H54.8 LEGAL BLINDNESS, AS DEFINED IN USA: ICD-10-CM

## 2025-01-21 DIAGNOSIS — Z94.7 CORNEAL TRANSPLANT STATUS: Chronic | ICD-10-CM

## 2025-01-21 DIAGNOSIS — Z00.00 ENCOUNTER FOR GENERAL ADULT MEDICAL EXAMINATION WITHOUT ABNORMAL FINDINGS: ICD-10-CM

## 2025-01-21 DIAGNOSIS — Z98.890 OTHER SPECIFIED POSTPROCEDURAL STATES: Chronic | ICD-10-CM

## 2025-01-21 DIAGNOSIS — E55.9 VITAMIN D DEFICIENCY, UNSPECIFIED: ICD-10-CM

## 2025-01-21 DIAGNOSIS — E11.9 TYPE 2 DIABETES MELLITUS W/OUT COMPLICATIONS: ICD-10-CM

## 2025-01-21 DIAGNOSIS — E78.5 HYPERLIPIDEMIA, UNSPECIFIED: ICD-10-CM

## 2025-01-21 DIAGNOSIS — M25.579 PAIN IN UNSPECIFIED ANKLE AND JOINTS OF UNSPECIFIED FOOT: ICD-10-CM

## 2025-01-21 DIAGNOSIS — K63.5 POLYP OF COLON: ICD-10-CM

## 2025-01-21 PROCEDURE — 99214 OFFICE O/P EST MOD 30 MIN: CPT

## 2025-01-21 PROCEDURE — G2211 COMPLEX E/M VISIT ADD ON: CPT

## 2025-01-21 RX ORDER — BRIMONIDINE TARTRATE, TIMOLOL MALEATE 2; 5 MG/ML; MG/ML
0.2-0.5 SOLUTION/ DROPS TOPICAL
Qty: 1 | Refills: 5 | Status: ACTIVE | COMMUNITY
Start: 2025-01-21

## 2025-01-22 RX ORDER — METHOCARBAMOL 750 MG/1
750 TABLET, FILM COATED ORAL EVERY 6 HOURS
Qty: 20 | Refills: 0 | Status: ACTIVE | COMMUNITY
Start: 2025-01-21 | End: 1900-01-01

## 2025-01-22 RX ORDER — BRIMONIDINE TARTRATE, TIMOLOL MALEATE 2; 5 MG/ML; MG/ML
0.2-0.5 SOLUTION/ DROPS TOPICAL TWICE DAILY
Qty: 1 | Refills: 5 | Status: ACTIVE | COMMUNITY
Start: 2025-01-21 | End: 1900-01-01

## 2025-01-23 DIAGNOSIS — E55.9 VITAMIN D DEFICIENCY, UNSPECIFIED: ICD-10-CM

## 2025-01-23 DIAGNOSIS — E78.5 HYPERLIPIDEMIA, UNSPECIFIED: ICD-10-CM

## 2025-01-23 DIAGNOSIS — M25.579 PAIN IN UNSPECIFIED ANKLE AND JOINTS OF UNSPECIFIED FOOT: ICD-10-CM

## 2025-01-23 DIAGNOSIS — E11.9 TYPE 2 DIABETES MELLITUS WITHOUT COMPLICATIONS: ICD-10-CM

## 2025-01-23 DIAGNOSIS — H54.8 LEGAL BLINDNESS, AS DEFINED IN USA: ICD-10-CM

## 2025-01-23 DIAGNOSIS — K63.5 POLYP OF COLON: ICD-10-CM

## 2025-02-11 ENCOUNTER — APPOINTMENT (OUTPATIENT)
Dept: PODIATRY | Facility: CLINIC | Age: 60
End: 2025-02-11

## 2025-02-25 ENCOUNTER — APPOINTMENT (OUTPATIENT)
Dept: OTOLARYNGOLOGY | Facility: CLINIC | Age: 60
End: 2025-02-25
Payer: COMMERCIAL

## 2025-02-25 PROCEDURE — 92507 TX SP LANG VOICE COMM INDIV: CPT | Mod: GN,59

## 2025-02-25 PROCEDURE — 92524 BEHAVRAL QUALIT ANALYS VOICE: CPT | Mod: GN,59

## 2025-03-03 ENCOUNTER — RX RENEWAL (OUTPATIENT)
Age: 60
End: 2025-03-03

## 2025-03-05 ENCOUNTER — APPOINTMENT (OUTPATIENT)
Dept: INTERNAL MEDICINE | Facility: CLINIC | Age: 60
End: 2025-03-05

## 2025-03-06 ENCOUNTER — APPOINTMENT (OUTPATIENT)
Dept: OTOLARYNGOLOGY | Facility: CLINIC | Age: 60
End: 2025-03-06
Payer: COMMERCIAL

## 2025-03-06 DIAGNOSIS — J38.4 EDEMA OF LARYNX: ICD-10-CM

## 2025-03-06 DIAGNOSIS — R49.0 DYSPHONIA: ICD-10-CM

## 2025-03-06 DIAGNOSIS — J38.3 OTHER DISEASES OF VOCAL CORDS: ICD-10-CM

## 2025-03-06 PROCEDURE — 31575 DIAGNOSTIC LARYNGOSCOPY: CPT

## 2025-03-06 PROCEDURE — 99214 OFFICE O/P EST MOD 30 MIN: CPT | Mod: 25

## 2025-03-06 PROCEDURE — 92507 TX SP LANG VOICE COMM INDIV: CPT | Mod: GN

## 2025-03-27 ENCOUNTER — APPOINTMENT (OUTPATIENT)
Dept: OTOLARYNGOLOGY | Facility: CLINIC | Age: 60
End: 2025-03-27
Payer: COMMERCIAL

## 2025-03-27 PROCEDURE — 92507 TX SP LANG VOICE COMM INDIV: CPT | Mod: GN

## 2025-04-03 ENCOUNTER — APPOINTMENT (OUTPATIENT)
Dept: OTOLARYNGOLOGY | Facility: CLINIC | Age: 60
End: 2025-04-03

## 2025-04-03 ENCOUNTER — RX RENEWAL (OUTPATIENT)
Age: 60
End: 2025-04-03

## 2025-04-10 ENCOUNTER — APPOINTMENT (OUTPATIENT)
Dept: OTOLARYNGOLOGY | Facility: CLINIC | Age: 60
End: 2025-04-10
Payer: COMMERCIAL

## 2025-04-10 PROCEDURE — 92507 TX SP LANG VOICE COMM INDIV: CPT | Mod: GN

## 2025-05-01 ENCOUNTER — APPOINTMENT (OUTPATIENT)
Dept: OTOLARYNGOLOGY | Facility: CLINIC | Age: 60
End: 2025-05-01
Payer: COMMERCIAL

## 2025-05-01 DIAGNOSIS — J38.4 EDEMA OF LARYNX: ICD-10-CM

## 2025-05-01 DIAGNOSIS — J38.3 OTHER DISEASES OF VOCAL CORDS: ICD-10-CM

## 2025-05-01 DIAGNOSIS — R49.0 DYSPHONIA: ICD-10-CM

## 2025-05-01 DIAGNOSIS — J38.2 NODULES OF VOCAL CORDS: ICD-10-CM

## 2025-05-01 PROCEDURE — 31575 DIAGNOSTIC LARYNGOSCOPY: CPT

## 2025-05-01 PROCEDURE — 99213 OFFICE O/P EST LOW 20 MIN: CPT | Mod: 25

## 2025-05-01 PROCEDURE — 92507 TX SP LANG VOICE COMM INDIV: CPT | Mod: GN

## 2025-05-01 RX ORDER — FLUTICASONE PROPIONATE 50 UG/1
50 SPRAY, METERED NASAL DAILY
Qty: 1 | Refills: 3 | Status: ACTIVE | COMMUNITY
Start: 2025-05-01 | End: 1900-01-01

## 2025-05-22 ENCOUNTER — APPOINTMENT (OUTPATIENT)
Dept: OTOLARYNGOLOGY | Facility: CLINIC | Age: 60
End: 2025-05-22
Payer: COMMERCIAL

## 2025-05-22 DIAGNOSIS — R49.0 DYSPHONIA: ICD-10-CM

## 2025-05-22 DIAGNOSIS — R22.1 LOCALIZED SWELLING, MASS AND LUMP, NECK: ICD-10-CM

## 2025-05-22 DIAGNOSIS — J38.4 EDEMA OF LARYNX: ICD-10-CM

## 2025-05-22 DIAGNOSIS — J38.2 NODULES OF VOCAL CORDS: ICD-10-CM

## 2025-05-22 DIAGNOSIS — J38.3 OTHER DISEASES OF VOCAL CORDS: ICD-10-CM

## 2025-05-22 PROCEDURE — 31575 DIAGNOSTIC LARYNGOSCOPY: CPT

## 2025-05-22 PROCEDURE — 99213 OFFICE O/P EST LOW 20 MIN: CPT | Mod: 25

## 2025-05-22 PROCEDURE — 92507 TX SP LANG VOICE COMM INDIV: CPT | Mod: GN

## 2025-05-29 ENCOUNTER — APPOINTMENT (OUTPATIENT)
Dept: OTOLARYNGOLOGY | Facility: CLINIC | Age: 60
End: 2025-05-29

## 2025-06-05 ENCOUNTER — APPOINTMENT (OUTPATIENT)
Dept: OTOLARYNGOLOGY | Facility: CLINIC | Age: 60
End: 2025-06-05
Payer: COMMERCIAL

## 2025-06-05 PROCEDURE — 92507 TX SP LANG VOICE COMM INDIV: CPT | Mod: GN

## 2025-06-12 ENCOUNTER — APPOINTMENT (OUTPATIENT)
Dept: OTOLARYNGOLOGY | Facility: CLINIC | Age: 60
End: 2025-06-12

## 2025-06-12 ENCOUNTER — OUTPATIENT (OUTPATIENT)
Dept: OUTPATIENT SERVICES | Facility: HOSPITAL | Age: 60
LOS: 1 days | End: 2025-06-12
Payer: COMMERCIAL

## 2025-06-12 DIAGNOSIS — Z00.00 ENCOUNTER FOR GENERAL ADULT MEDICAL EXAMINATION WITHOUT ABNORMAL FINDINGS: ICD-10-CM

## 2025-06-12 DIAGNOSIS — Z94.7 CORNEAL TRANSPLANT STATUS: Chronic | ICD-10-CM

## 2025-06-12 DIAGNOSIS — Z98.890 OTHER SPECIFIED POSTPROCEDURAL STATES: Chronic | ICD-10-CM

## 2025-06-12 PROCEDURE — 82306 VITAMIN D 25 HYDROXY: CPT

## 2025-06-12 PROCEDURE — 83036 HEMOGLOBIN GLYCOSYLATED A1C: CPT

## 2025-06-12 PROCEDURE — 85025 COMPLETE CBC W/AUTO DIFF WBC: CPT

## 2025-06-12 PROCEDURE — 80053 COMPREHEN METABOLIC PANEL: CPT

## 2025-06-12 PROCEDURE — 80061 LIPID PANEL: CPT

## 2025-06-12 PROCEDURE — 84443 ASSAY THYROID STIM HORMONE: CPT

## 2025-06-13 DIAGNOSIS — Z00.00 ENCOUNTER FOR GENERAL ADULT MEDICAL EXAMINATION WITHOUT ABNORMAL FINDINGS: ICD-10-CM

## 2025-06-17 ENCOUNTER — APPOINTMENT (OUTPATIENT)
Dept: NEPHROLOGY | Facility: CLINIC | Age: 60
End: 2025-06-17
Payer: COMMERCIAL

## 2025-06-17 ENCOUNTER — OUTPATIENT (OUTPATIENT)
Dept: OUTPATIENT SERVICES | Facility: HOSPITAL | Age: 60
LOS: 1 days | End: 2025-06-17
Payer: COMMERCIAL

## 2025-06-17 VITALS
HEIGHT: 68 IN | BODY MASS INDEX: 31.37 KG/M2 | OXYGEN SATURATION: 99 % | TEMPERATURE: 97.3 F | WEIGHT: 207 LBS | DIASTOLIC BLOOD PRESSURE: 80 MMHG | HEART RATE: 68 BPM | SYSTOLIC BLOOD PRESSURE: 149 MMHG

## 2025-06-17 VITALS — SYSTOLIC BLOOD PRESSURE: 148 MMHG | DIASTOLIC BLOOD PRESSURE: 83 MMHG

## 2025-06-17 DIAGNOSIS — Z00.00 ENCOUNTER FOR GENERAL ADULT MEDICAL EXAMINATION WITHOUT ABNORMAL FINDINGS: ICD-10-CM

## 2025-06-17 DIAGNOSIS — Z98.890 OTHER SPECIFIED POSTPROCEDURAL STATES: Chronic | ICD-10-CM

## 2025-06-17 DIAGNOSIS — Z94.7 CORNEAL TRANSPLANT STATUS: Chronic | ICD-10-CM

## 2025-06-17 PROCEDURE — 99214 OFFICE O/P EST MOD 30 MIN: CPT

## 2025-06-18 DIAGNOSIS — I10 ESSENTIAL (PRIMARY) HYPERTENSION: ICD-10-CM

## 2025-06-18 DIAGNOSIS — E11.9 TYPE 2 DIABETES MELLITUS WITHOUT COMPLICATIONS: ICD-10-CM

## 2025-06-19 ENCOUNTER — NON-APPOINTMENT (OUTPATIENT)
Age: 60
End: 2025-06-19

## 2025-06-19 ENCOUNTER — APPOINTMENT (OUTPATIENT)
Dept: OTOLARYNGOLOGY | Facility: CLINIC | Age: 60
End: 2025-06-19
Payer: COMMERCIAL

## 2025-06-19 PROCEDURE — 92507 TX SP LANG VOICE COMM INDIV: CPT | Mod: GN

## 2025-06-26 ENCOUNTER — APPOINTMENT (OUTPATIENT)
Dept: OTOLARYNGOLOGY | Facility: CLINIC | Age: 60
End: 2025-06-26
Payer: COMMERCIAL

## 2025-06-26 PROCEDURE — 92507 TX SP LANG VOICE COMM INDIV: CPT | Mod: GN

## 2025-06-30 PROBLEM — M25.511 RIGHT SHOULDER PAIN: Status: ACTIVE | Noted: 2025-06-30

## 2025-07-03 ENCOUNTER — APPOINTMENT (OUTPATIENT)
Dept: OTOLARYNGOLOGY | Facility: CLINIC | Age: 60
End: 2025-07-03
Payer: COMMERCIAL

## 2025-07-03 PROCEDURE — 92507 TX SP LANG VOICE COMM INDIV: CPT | Mod: GN

## 2025-07-07 ENCOUNTER — RX RENEWAL (OUTPATIENT)
Age: 60
End: 2025-07-07

## 2025-07-10 ENCOUNTER — APPOINTMENT (OUTPATIENT)
Dept: OTOLARYNGOLOGY | Facility: CLINIC | Age: 60
End: 2025-07-10
Payer: COMMERCIAL

## 2025-07-10 VITALS — WEIGHT: 204 LBS | HEIGHT: 68 IN | BODY MASS INDEX: 30.92 KG/M2

## 2025-07-10 PROCEDURE — 99214 OFFICE O/P EST MOD 30 MIN: CPT | Mod: 25

## 2025-07-10 PROCEDURE — 92507 TX SP LANG VOICE COMM INDIV: CPT | Mod: GN

## 2025-07-10 PROCEDURE — 31575 DIAGNOSTIC LARYNGOSCOPY: CPT

## 2025-07-16 ENCOUNTER — APPOINTMENT (OUTPATIENT)
Dept: OTOLARYNGOLOGY | Facility: CLINIC | Age: 60
End: 2025-07-16

## 2025-07-23 ENCOUNTER — APPOINTMENT (OUTPATIENT)
Dept: INTERNAL MEDICINE | Facility: CLINIC | Age: 60
End: 2025-07-23
Payer: COMMERCIAL

## 2025-07-23 ENCOUNTER — OUTPATIENT (OUTPATIENT)
Dept: OUTPATIENT SERVICES | Facility: HOSPITAL | Age: 60
LOS: 1 days | End: 2025-07-23
Payer: COMMERCIAL

## 2025-07-23 VITALS
OXYGEN SATURATION: 98 % | TEMPERATURE: 97 F | SYSTOLIC BLOOD PRESSURE: 152 MMHG | BODY MASS INDEX: 31.37 KG/M2 | HEART RATE: 86 BPM | HEIGHT: 68 IN | WEIGHT: 207 LBS | DIASTOLIC BLOOD PRESSURE: 78 MMHG

## 2025-07-23 VITALS — SYSTOLIC BLOOD PRESSURE: 148 MMHG | DIASTOLIC BLOOD PRESSURE: 72 MMHG

## 2025-07-23 DIAGNOSIS — Z94.7 CORNEAL TRANSPLANT STATUS: Chronic | ICD-10-CM

## 2025-07-23 DIAGNOSIS — I10 ESSENTIAL (PRIMARY) HYPERTENSION: ICD-10-CM

## 2025-07-23 DIAGNOSIS — K63.5 POLYP OF COLON: ICD-10-CM

## 2025-07-23 DIAGNOSIS — L91.8 OTHER HYPERTROPHIC DISORDERS OF THE SKIN: ICD-10-CM

## 2025-07-23 DIAGNOSIS — E78.5 HYPERLIPIDEMIA, UNSPECIFIED: ICD-10-CM

## 2025-07-23 DIAGNOSIS — Z98.890 OTHER SPECIFIED POSTPROCEDURAL STATES: Chronic | ICD-10-CM

## 2025-07-23 DIAGNOSIS — M25.579 PAIN IN UNSPECIFIED ANKLE AND JOINTS OF UNSPECIFIED FOOT: ICD-10-CM

## 2025-07-23 DIAGNOSIS — Z00.00 ENCOUNTER FOR GENERAL ADULT MEDICAL EXAMINATION WITHOUT ABNORMAL FINDINGS: ICD-10-CM

## 2025-07-23 DIAGNOSIS — E11.9 TYPE 2 DIABETES MELLITUS W/OUT COMPLICATIONS: ICD-10-CM

## 2025-07-23 DIAGNOSIS — M54.9 DORSALGIA, UNSPECIFIED: ICD-10-CM

## 2025-07-23 DIAGNOSIS — K21.9 GASTRO-ESOPHAGEAL REFLUX DISEASE W/OUT ESOPHAGITIS: ICD-10-CM

## 2025-07-23 PROCEDURE — 99214 OFFICE O/P EST MOD 30 MIN: CPT

## 2025-07-23 PROCEDURE — G2211 COMPLEX E/M VISIT ADD ON: CPT

## 2025-07-24 DIAGNOSIS — L91.8 OTHER HYPERTROPHIC DISORDERS OF THE SKIN: ICD-10-CM

## 2025-07-24 DIAGNOSIS — M25.579 PAIN IN UNSPECIFIED ANKLE AND JOINTS OF UNSPECIFIED FOOT: ICD-10-CM

## 2025-07-24 DIAGNOSIS — K63.5 POLYP OF COLON: ICD-10-CM

## 2025-07-24 DIAGNOSIS — E11.9 TYPE 2 DIABETES MELLITUS WITHOUT COMPLICATIONS: ICD-10-CM

## 2025-07-24 DIAGNOSIS — M54.9 DORSALGIA, UNSPECIFIED: ICD-10-CM

## 2025-07-24 DIAGNOSIS — E78.5 HYPERLIPIDEMIA, UNSPECIFIED: ICD-10-CM

## 2025-07-24 DIAGNOSIS — K21.9 GASTRO-ESOPHAGEAL REFLUX DISEASE WITHOUT ESOPHAGITIS: ICD-10-CM

## 2025-07-24 DIAGNOSIS — I10 ESSENTIAL (PRIMARY) HYPERTENSION: ICD-10-CM

## 2025-08-01 ENCOUNTER — APPOINTMENT (OUTPATIENT)
Dept: OTOLARYNGOLOGY | Facility: CLINIC | Age: 60
End: 2025-08-01
Payer: COMMERCIAL

## 2025-08-01 PROCEDURE — 92507 TX SP LANG VOICE COMM INDIV: CPT | Mod: GN

## 2025-09-02 ENCOUNTER — RX RENEWAL (OUTPATIENT)
Age: 60
End: 2025-09-02